# Patient Record
Sex: MALE | Race: BLACK OR AFRICAN AMERICAN | Employment: PART TIME | URBAN - METROPOLITAN AREA
[De-identification: names, ages, dates, MRNs, and addresses within clinical notes are randomized per-mention and may not be internally consistent; named-entity substitution may affect disease eponyms.]

---

## 2021-06-07 ENCOUNTER — HOSPITAL ENCOUNTER (OUTPATIENT)
Dept: CT IMAGING | Age: 61
Discharge: HOME | End: 2021-06-07
Payer: COMMERCIAL

## 2021-06-07 DIAGNOSIS — R91.8 ABNORMAL LUNG FIELD: ICD-10-CM

## 2021-06-07 PROCEDURE — 71250 CT THORAX DX C-: CPT

## 2021-07-02 ENCOUNTER — HOSPITAL ENCOUNTER (OUTPATIENT)
Dept: NUCLEAR MEDICINE | Age: 61
Discharge: HOME | End: 2021-07-02
Payer: COMMERCIAL

## 2021-07-02 DIAGNOSIS — C34.91 MALIGNANT NEOPLASM OF RIGHT LUNG, UNSPECIFIED PART OF LUNG (HCC): ICD-10-CM

## 2021-07-02 PROCEDURE — 78815 PET IMAGE W/CT SKULL-THIGH: CPT

## 2021-07-02 PROCEDURE — A9552 F18 FDG: HCPCS | Performed by: RADIOLOGY

## 2021-07-02 PROCEDURE — 3430000000 HC RX DIAGNOSTIC RADIOPHARMACEUTICAL: Performed by: RADIOLOGY

## 2021-07-02 RX ORDER — FLUDEOXYGLUCOSE F 18 200 MCI/ML
15 INJECTION, SOLUTION INTRAVENOUS
Status: COMPLETED | OUTPATIENT
Start: 2021-07-02 | End: 2021-07-02

## 2021-07-02 RX ADMIN — FLUDEOXYGLUCOSE F 18 15 MILLICURIE: 200 INJECTION, SOLUTION INTRAVENOUS at 16:57

## 2021-07-16 ENCOUNTER — HOSPITAL ENCOUNTER (OUTPATIENT)
Age: 61
Discharge: HOME | End: 2021-07-18
Payer: COMMERCIAL

## 2021-07-16 PROCEDURE — U0003 INFECTIOUS AGENT DETECTION BY NUCLEIC ACID (DNA OR RNA); SEVERE ACUTE RESPIRATORY SYNDROME CORONAVIRUS 2 (SARS-COV-2) (CORONAVIRUS DISEASE [COVID-19]), AMPLIFIED PROBE TECHNIQUE, MAKING USE OF HIGH THROUGHPUT TECHNOLOGIES AS DESCRIBED BY CMS-2020-01-R: HCPCS

## 2021-07-16 PROCEDURE — U0005 INFEC AGEN DETEC AMPLI PROBE: HCPCS

## 2021-07-18 LAB — SARS-COV-2, PCR: NOT DETECTED

## 2021-07-20 ENCOUNTER — HOSPITAL ENCOUNTER (OUTPATIENT)
Dept: GENERAL RADIOLOGY | Age: 61
Discharge: HOME | End: 2021-07-22
Payer: COMMERCIAL

## 2021-07-20 ENCOUNTER — HOSPITAL ENCOUNTER (OUTPATIENT)
Dept: CT IMAGING | Age: 61
Discharge: HOME | End: 2021-07-20
Payer: COMMERCIAL

## 2021-07-20 VITALS
OXYGEN SATURATION: 99 % | BODY MASS INDEX: 35 KG/M2 | DIASTOLIC BLOOD PRESSURE: 68 MMHG | HEART RATE: 73 BPM | WEIGHT: 250 LBS | TEMPERATURE: 97 F | RESPIRATION RATE: 16 BRPM | SYSTOLIC BLOOD PRESSURE: 132 MMHG | HEIGHT: 71 IN

## 2021-07-20 DIAGNOSIS — R91.1 LUNG NODULE: ICD-10-CM

## 2021-07-20 DIAGNOSIS — R06.02 SHORTNESS OF BREATH: ICD-10-CM

## 2021-07-20 DIAGNOSIS — J95.811 PNEUMOTHORAX AFTER BIOPSY: ICD-10-CM

## 2021-07-20 DIAGNOSIS — J93.83 OTHER PNEUMOTHORAX: ICD-10-CM

## 2021-07-20 LAB
APTT: 32.3 SEC (ref 24.5–35.1)
HCT VFR BLD CALC: 44.8 % (ref 37–54)
HEMOGLOBIN: 14.1 G/DL (ref 12.5–16.5)
INR BLD: 1
MCH RBC QN AUTO: 26.5 PG (ref 26–35)
MCHC RBC AUTO-ENTMCNC: 31.5 % (ref 32–34.5)
MCV RBC AUTO: 84.1 FL (ref 80–99.9)
METER GLUCOSE: 145 MG/DL (ref 74–99)
PDW BLD-RTO: 14.2 FL (ref 11.5–15)
PLATELET # BLD: 241 E9/L (ref 130–450)
PMV BLD AUTO: 9.2 FL (ref 7–12)
PROTHROMBIN TIME: 12.1 SEC (ref 9.3–12.4)
RBC # BLD: 5.33 E12/L (ref 3.8–5.8)
WBC # BLD: 8.6 E9/L (ref 4.5–11.5)

## 2021-07-20 PROCEDURE — 7100000010 HC PHASE II RECOVERY - FIRST 15 MIN

## 2021-07-20 PROCEDURE — 71045 X-RAY EXAM CHEST 1 VIEW: CPT

## 2021-07-20 PROCEDURE — 7100000011 HC PHASE II RECOVERY - ADDTL 15 MIN

## 2021-07-20 PROCEDURE — 85730 THROMBOPLASTIN TIME PARTIAL: CPT

## 2021-07-20 PROCEDURE — 88341 IMHCHEM/IMCYTCHM EA ADD ANTB: CPT

## 2021-07-20 PROCEDURE — 32408 CORE NDL BX LNG/MED PERQ: CPT

## 2021-07-20 PROCEDURE — 88342 IMHCHEM/IMCYTCHM 1ST ANTB: CPT

## 2021-07-20 PROCEDURE — 82962 GLUCOSE BLOOD TEST: CPT

## 2021-07-20 PROCEDURE — 6360000002 HC RX W HCPCS: Performed by: RADIOLOGY

## 2021-07-20 PROCEDURE — 85610 PROTHROMBIN TIME: CPT

## 2021-07-20 PROCEDURE — 88305 TISSUE EXAM BY PATHOLOGIST: CPT

## 2021-07-20 PROCEDURE — 85027 COMPLETE CBC AUTOMATED: CPT

## 2021-07-20 PROCEDURE — 36415 COLL VENOUS BLD VENIPUNCTURE: CPT

## 2021-07-20 RX ORDER — SODIUM CHLORIDE 0.9 % (FLUSH) 0.9 %
10 SYRINGE (ML) INJECTION PRN
Status: DISCONTINUED | OUTPATIENT
Start: 2021-07-20 | End: 2021-07-21 | Stop reason: HOSPADM

## 2021-07-20 RX ORDER — FENTANYL CITRATE 50 UG/ML
INJECTION, SOLUTION INTRAMUSCULAR; INTRAVENOUS
Status: COMPLETED | OUTPATIENT
Start: 2021-07-20 | End: 2021-07-20

## 2021-07-20 RX ORDER — MIDAZOLAM HYDROCHLORIDE 1 MG/ML
INJECTION INTRAMUSCULAR; INTRAVENOUS
Status: COMPLETED | OUTPATIENT
Start: 2021-07-20 | End: 2021-07-20

## 2021-07-20 RX ADMIN — MIDAZOLAM 1 MG: 1 INJECTION INTRAMUSCULAR; INTRAVENOUS at 09:05

## 2021-07-20 RX ADMIN — FENTANYL CITRATE 50 MCG: 50 INJECTION INTRAMUSCULAR; INTRAVENOUS at 09:05

## 2021-07-20 ASSESSMENT — PAIN DESCRIPTION - LOCATION
LOCATION: BACK

## 2021-07-20 ASSESSMENT — PAIN SCALES - GENERAL
PAINLEVEL_OUTOF10: 3
PAINLEVEL_OUTOF10: 4
PAINLEVEL_OUTOF10: 3
PAINLEVEL_OUTOF10: 4
PAINLEVEL_OUTOF10: 3

## 2021-07-20 ASSESSMENT — PAIN DESCRIPTION - DESCRIPTORS
DESCRIPTORS: ACHING
DESCRIPTORS: ACHING
DESCRIPTORS: ACHING;SORE
DESCRIPTORS: ACHING

## 2021-07-20 ASSESSMENT — PAIN DESCRIPTION - ORIENTATION
ORIENTATION: RIGHT
ORIENTATION: RIGHT;UPPER
ORIENTATION: UPPER
ORIENTATION: RIGHT;UPPER
ORIENTATION: RIGHT
ORIENTATION: UPPER;RIGHT

## 2021-07-20 ASSESSMENT — PAIN DESCRIPTION - PAIN TYPE
TYPE: SURGICAL PAIN

## 2021-07-20 ASSESSMENT — PAIN - FUNCTIONAL ASSESSMENT: PAIN_FUNCTIONAL_ASSESSMENT: 0-10

## 2021-07-20 NOTE — PROGRESS NOTES
7/20/21 1400 dr Poornima Arboleda here and talked to pt.  Instructed pt to \"take it easy when he goes home\" \"dont do anything strenuous and to return to [employer] with shortness of breath or chest pain' pt verbalized understanding. osmani rn

## 2021-07-20 NOTE — PROGRESS NOTES
7/20/21 1430 reviewed discharge instructions with pt.  Pt verbalized understanding,s igned in agreement and given copy. osmani rn

## 2021-07-20 NOTE — PROGRESS NOTES
7/20/21 1500 prn adapter removed from pt right forearm. Pressure applied to site and dsd applied.  Pt tolerated well. osmani rn

## 2021-07-20 NOTE — PROGRESS NOTES
Serial chest xrays showed a small, stable right pneumothorax following right lung mass biopsy. The patient was asymptomatic and saturating on room air at 99%. He was discharged home with instructions to return to the hospital if he experienced shortness of breath or chest pain. He assures me that someone will be staying over night with him, should he require assistance.

## 2021-08-03 ENCOUNTER — TELEPHONE (OUTPATIENT)
Dept: SURGERY | Age: 61
End: 2021-08-03

## 2021-08-03 NOTE — TELEPHONE ENCOUNTER
Called and left message for patient to call back to schedule mediport with Dr. Jennifer Mc.     Electronically signed by Lacy Verma MA on 8/3/2021 at 10:38 AM

## 2021-08-12 ENCOUNTER — HOSPITAL ENCOUNTER (OUTPATIENT)
Dept: RADIATION ONCOLOGY | Age: 61
Discharge: HOME | End: 2021-08-12
Payer: COMMERCIAL

## 2021-08-12 VITALS
OXYGEN SATURATION: 96 % | WEIGHT: 255.6 LBS | DIASTOLIC BLOOD PRESSURE: 75 MMHG | RESPIRATION RATE: 20 BRPM | HEART RATE: 78 BPM | SYSTOLIC BLOOD PRESSURE: 140 MMHG | BODY MASS INDEX: 35.65 KG/M2

## 2021-08-12 DIAGNOSIS — C34.91 NSCLC OF RIGHT LUNG (HCC): ICD-10-CM

## 2021-08-12 DIAGNOSIS — C34.90 MALIGNANT NEOPLASM OF LUNG, UNSPECIFIED LATERALITY, UNSPECIFIED PART OF LUNG (HCC): Primary | ICD-10-CM

## 2021-08-12 PROCEDURE — 99245 OFF/OP CONSLTJ NEW/EST HI 55: CPT | Performed by: RADIOLOGY

## 2021-08-12 PROCEDURE — 99205 OFFICE O/P NEW HI 60 MIN: CPT

## 2021-08-12 NOTE — PROGRESS NOTES
Rafaela Michael  1960 62 y.o. Referring Physician: Steven Lagos     PCP: Valarie Coelho DO     Vitals:    21 1455   BP: (!) 140/75   Pulse: 78   Resp: 20   SpO2: 96%        Wt Readings from Last 3 Encounters:   21 255 lb 9.6 oz (115.9 kg)   21 250 lb (113.4 kg)   21 250 lb (113.4 kg)        Body mass index is 35.65 kg/m². Chief Complaint: No chief complaint on file. Cancer Staging  No matching staging information was found for the patient. Prior Radiation Therapy? NO    Concurrent Chemo/radiation? NO    Prior Chemotherapy? NO    Prior Hormonal Therapy? NO    Head and Neck Cancer? No, patient does NOT have HN cancer. Current Outpatient Medications   Medication Sig Dispense Refill    losartan (COZAAR) 100 MG tablet Take 100 mg by mouth daily      predniSONE (DELTASONE) 10 MG tablet Take 10 mg by mouth every other day      ibuprofen (ADVIL;MOTRIN) 200 MG tablet Take 400 mg by mouth every 6 hours as needed for Pain      Naproxen Sodium (ALEVE PO) Take by mouth as needed      acetaminophen (TYLENOL) 500 MG tablet Take 500 mg by mouth every 6 hours as needed for Pain       No current facility-administered medications for this encounter.        Past Medical History:   Diagnosis Date    Arthritis     R hip     Cancer ([address]     lung     Hypertension        Past Surgical History:   Procedure Laterality Date    IR PERCUT BX LUNG/MEDIASTINUM  2021    IR PERCUT BX LUNG/MEDIASTINUM 2021 [facility] CT       Family History   Problem Relation Age of Onset    Cancer Brother         lung        Social History     Socioeconomic History    Marital status: Life Partner     Spouse name: Mendoza Aggarwal     Number of children: 3    Years of education: some college     Highest education level: Not on file   Occupational History    Not on file   Tobacco Use    Smoking status: Former Smoker     Quit date:      Years since quittin.6    Smokeless tobacco: Never Used Tobacco comment: Quit May 2021    Substance and Sexual Activity    Alcohol use: Yes     Alcohol/week: 1.0 standard drinks     Types: 1 Cans of beer per week     Comment: weekend    Drug use: Yes     Types: Marijuana     Comment: smoked 7/12/21; mostly edibles     Sexual activity: Not on file   Other Topics Concern    Not on file   Social History Narrative    Not on file     Social Determinants of Health     Financial Resource Strain:     Difficulty of Paying Living Expenses:    Food Insecurity:     Worried About [address] in the Last Year:     Ran Out of Food in the Last Year:    Transportation Needs:     Lack of Transportation (Medical):     Lack of Transportation (Non-Medical):    Physical Activity:     Days of Exercise per Week:     Minutes of Exercise per Session:    Stress:     Feeling of Stress :    Social Connections:     Frequency of Communication with Friends and Family:     Frequency of Social Gatherings with Friends and Family:     Attends Orthodox Services: Active Member of Clubs or Organizations:     Attends Club or Organization Meetings:     Marital Status:    Intimate Partner Violence:     Fear of Current or Ex-Partner:     Emotionally Abused:     Physically Abused:     Sexually Abused:            Occupation: Maintenance     Retired:  NO        REVIEW OF SYSTEMS: Pt being seen in office for consult related to lung cancer. Pt is alert & oriented x 4. VswnL. Denies any pain or discomfort. 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 Pt follows with Dr. Brittany Easley. Pt. Had a CT on 6/7 that showed a spiculated soft tissue mas to RUL & mediastinal lymphadenopathy. On 7/2 he had a pet that showed RUL SUV 20, R hilar node SUV 11, R paratracheal node SUV 5.5, R sub carinal node SUV 4.2, R upper mediastinal node SUV 5.1 & L upper mediastinal node SUV 4.4.  Pt had a CT guided biopsy on 7/20/21 & pathology revealed moderately to poorly differentiated change in patient condition which places them at a risk for a fall   4-6 Moderate Risk 1. Provide assistance as indicated for ambulation activities  2. Reorient confused/cognitively impaired patient  3. Call-light/bell within patient's reach  4. Chair/bed in low position, stretcher/bed with siderails up except when performing patient care activities  5. Educate patient/family/caregiver on falls prevention  6. Falls risk precaution (Yellow sticker Level II) placed on patient chart   7 or   Higher High Risk 1. Place patient in easily observable treatment room  2. Patient attended at all times by family member or staff  3. Provide assistance as indicated for ambulation activities  4. Reorient confused/cognitively impaired patient  5. Call-light/bell within patient's reach  6. Chair/bed in low position, stretcher/bed with siderails up except when performing patient care activities  7. Educate patient/family/caregiver on falls prevention  8. Falls risk precaution (Yellow sticker Level III) placed on patient chart           MALNUTRITION RISK SCREENING ASSESSMENT    Instructions:  Assess the patient and enter the appropriate indicators that are present for nutrition risk identification. Total the numbers entered and assign a risk score. Follow the appropriate action for total score listed below. Assessment   Date  8/12/2021     Have you lost weight without trying? 0- No     Have you been eating poorly because of a decreased appetite? 0- No   3. Do you have a diagnosis of head and neck cancer? 0- No                                                                                    TOTAL 0          Score of 0-1: No action  Score 2 or greater:   For Non-Diabetic Patient: Recommend adding Ensure Complete 2 x daily and provide patient with Ensure wellness bag with coupons  For Diabetic Patient: Recommend adding Glucerna Shake 2 x daily and provide patient with Glucerna Wellness bag with coupons  Route to the dietitian via [address]    Are you having difficulty performing daily routine tasks due to fatigue or weakness (ie: bathing/showering, dressing, housework, meal prep, work, , etc): No     Do you have any arm flexibility/ROM restrictions, swelling or pain that limit activity: No     Any changes in memory, attention/focus that impact daily activities: No     Do you avoid participation in leisure/social activity due to weakness, fatigue or pain: No     ARE ANY OF THE ABOVE ARE ANSWERED YES: No          PT ASSESSMENT FOR REFERRAL    Have you had any recent falls in the past 2 months: No     Do you have difficulty going up/down stairs: No     Are you having difficulty walking: No     Do you often hold onto furniture/environmental supports or feel off balance when you are walking: No     Do you need to take rest breaks when you are walking: No     Any pain on a scale of 1-10 that limits your mobility: No 0 /10    ARE ANY OF THE ABOVE ARE ANSWERED YES: No                     PREHAB AUDIOLOGY REFERRAL    - Is patient planned to receive Cisplatin? Unknown. Will check with Dr Jewel Espitia MD and request audiology consult as indicated. - Is patient planned to receive radiation therapy that may be directed toward auditory canals or nerves? No. Patient is not planned to start radiation therapy to auditory canals or nerves. - Is patient complaining of new onset hearing loss? No. Patient is not complaining of new onset hearing loss. Patient education given on RT. The patient expresses understanding and acceptance of instructions.  Orville Roblero RN 8/12/2021 3:03 PM           Orville Roblero RN

## 2021-08-12 NOTE — PROGRESS NOTES
Radiation Oncology      Siomara Castro. [address]      Referring Physician: Dr. Elaine Lorenzo      Primary Care Physician:Nitin Fraga DO   Primary Oncologist: Dr. Elaine Lorenzo      Diagnosis: cT3 cN2 cMo RUL NSCLC      Service:  Radiation Oncology consultation performed on 8/12/21        HPI:        Jersey Spivey is an pleasant and well appearing 62year old with locally advanced NSCLC. Pt follows with Dr. Sneha Davies. Pt. Had a CT on 6/7 that showed a spiculated soft tissue mas to RUL & mediastinal lymphadenopathy. On 7/2 he had a pet that showed RUL SUV 20, R hilar node SUV 11, R paratracheal node SUV 5.5, R sub carinal node SUV 4.2, R upper mediastinal node SUV 5.1 & L upper mediastinal node SUV 4.4. Pt had a CT guided biopsy on 7/20/21 & pathology revealed moderately to poorly differentiated squamous cell carcinoma. Clinical stage IIIC. The patient presents today to discuss fractionated external beam radiation therapy as a component of multidisciplinary, definative management. We reviewed the available medical records including the complete medical history of this pt today prior to consultation. Epic -CE and available scanned documents per the Epic Media tab were reviewed PRN. A complete ROS was also performed today and is noted below. During consultation today I personally discussed the pts workup to date; including but not limited to applicable imaging studies, Pathology reports, and interventions. The NCCN guidelines, as pertaining to the above diagnosis were also recapped for the pt today in brief. Today, Lb Safe  notes Sx that include periodic cough. KPS 80-90.        -----    Per [address]:      [place] records reviewed.     -----    Pathology reviewed:    7/20/21:  Diagnosis:   Mass, lung, transbronchial biopsy: Invasive moderately to poorly   differentiated squamous cell carcinoma.     -----      Past Medical History:   Diagnosis Date    Arthritis     R hip     [address] [address]     lung     Hypertension        Past Surgical History:   Procedure Laterality Date    IR PERCUT BX LUNG/MEDIASTINUM  2021    IR PERCUT BX LUNG/MEDIASTINUM 2021 [facility]       Family History   Problem Relation Age of Onset    Cancer Brother         lung        Current Outpatient Medications   Medication Sig Dispense Refill    losartan (COZAAR) 100 MG tablet Take 100 mg by mouth daily      predniSONE (DELTASONE) 10 MG tablet Take 10 mg by mouth every other day      ibuprofen (ADVIL;MOTRIN) 200 MG tablet Take 400 mg by mouth every 6 hours as needed for Pain      Naproxen Sodium (ALEVE PO) Take by mouth as needed      acetaminophen (TYLENOL) 500 MG tablet Take 500 mg by mouth every 6 hours as needed for Pain       No current facility-administered medications for this encounter. No Known Allergies    Social History     Socioeconomic History    Marital status: Life Partner     Spouse name: Kiko See     Number of children: 3    Years of education: some college     Highest education level: None   Occupational History    None   Tobacco Use    Smoking status: Former Smoker     Quit date:      Years since quittin.6    Smokeless tobacco: Never Used    Tobacco comment: Quit May 2021    Substance and Sexual Activity    Alcohol use: Yes     Alcohol/week: 1.0 standard drinks     Types: 1 Cans of beer per week     Comment: weekend    Drug use: Yes     Types: Marijuana     Comment: smoked 21; mostly edibles     Sexual activity: None   Other Topics Concern    None   Social History Narrative    None     Social Determinants of Health     Financial Resource Strain:     Difficulty of Paying Living Expenses:    Food Insecurity:     Worried About Running Out of Food in the Last Year:     Ran Out of Food in the Last Year:    Transportation Needs:     Lack of Transportation (Medical):      Lack of Skin is dry. Neurological:      General: No focal deficit present. Mental Status: He is alert. Psychiatric:         Mood and Affect: Mood normal.         Behavior: Behavior normal.         Thought Content: Thought content normal.         Judgment: Judgment normal.             Imaging reviewed:      -MRI brain pending    -PET 7/2/21:  Impression   1.  Metabolically active right upper lobe mass consistent with history of   lung cancer with metabolically active metastatic right hilar and mediastinal   lymph node metastases including right and left upper mediastinal lymph nodes   and a subcarinal lymph node.       2.  No metabolically active distant metastatic disease. Radiation Safety and Treatment Support:  -previous Radiation history: No  -history of connective tissue disease: No  -history of autoimmune disease: No  -pregnant: not applicable  -fertility conservation and /or contraception discussed: no  -nutrition consult prior to [place]: Yes  -PEG: No  -Dental evaluation prior to treatment:No  -Social Work requested: Yes  -Oncology Nurse Navigator requested: Yes  -pre + post treatment PT / Rehab / PM+R evaluation considered: Yes  -ICD: No   -ICD brand: -  -[facility] patient navigator: Yoav Parrish  -Nurse Practitioners for Radiation Oncology:    ---Crystal Pearson, MSN, RN, FNP-C   ---Mukesh Lujan, MSN, RN, FNP-BC        Assessment and Plan: Mark Williamson is a pleasant and cooperative 62year old with a recent diagnosis of AJCC stage group III NSCLC. We recommend a concurrent approach to definitive management of this locally advanced non small cell lung cancer, biopsy proven NSCLC [chemo dosing per [address] record- see EMR]. Based on the clinical characteristic, this disease and stage is generally considered unresectable; optimal therapy tends to be a concurrent chemo-RT approach.  Concomitant chemo-RT compared with sequential chemo-RT improved overall survival, primarily through better locoregional control, at the cost of manageable increases in acute esophageal toxicity as based on the Auperin metanalysis. These data demonstrate a benefit of concomitant chemo-RT over sequential chemo->RT on OS (HR 0.84, SS), with absolute benefit at 3-years of 5.7% (18% to 24%), 5-years 4.5% (11% to 15%). Interestingly, there was no difference in PFS (HR 0.9, p=0.07). However a decrease in locoregional progression (HR 0.777, SS), with absolute decrease of 6% at 5 years (35% to 29%) was shown. There was no difference on distant progression (HR 1.04, NS), with 5-year rate of about 40% Lubertha Mandril Oncol 2010 May 1;28(13):9682-1754). Regarding the radiation dose, 60 Gy in 2Gy/fx was established long ago in RTOG 73-01, with several other trials showing a feasibility of much higher doses however with RT alone. Interestingly in the concurrent era, RTOG 0617 tested higher dose arms (Concurrent RT + Carbo/Taxol +/- Cetuximab) these were closed early with \"negative\" results (longer term results and POFA needed), therefor 60 Gy in daily fractions with dual agent chemotherapy can be considered the standard (the Agnieszka and LAMP trials also assisted in the development of this paradigm). Fractionated external beam radiation therapy may or may not be delivered with intensity modulation +/- image guidance per daily cone beam depending on the specific patient anatomy and dose constraints as described per the RTOG and QUANTEC ---Milton Chiu, Int J Radiat Oncol Biol Phys. 2010 Mar 1;76(3 Suppl):S10-9. The risks, benefits, alternatives, process and logistics of external beam radiation were reviewed (risks include but are not limited to worsening PULM function, fibrosis, esophagitis, esophageal structure, perforations, bleeding, paralysis and death). We answered all of the patient's questions to the best of our ability. Woodard Krabbe verbalized understanding and seemed satisfied.  Radiation planning will commence within 7-10 days; the next step in management being the simulation scan, with external beam radiation to commence in a timely fashion thereafter. It was a pleasure meeting Yee Busby today and we appreciate the referral and opportunity to be involved in his care. We had an extensive discussion today regarding the course to date (including a focused review of theapplicable radiographic and laboratory information), multidisciplinary approach to cancer care, and indications for external beam radiation therapy as a component therein. A literature review and multidisciplinary discussion was performed after seeing this patient due to the complexity of the medical decision making in this case. I personally spent greater than 90 minutes on this case and with this patient. I performed the complete history and physical as above at today's visit, at least 45 minutes was in direct discussion and  regarding disease management.          -sim ASAP  -concurrent        Johnson Foley. Raza Chaudhary MD [place] [address] Oncology  Cell: [phone]    [facility]:  [facility]: [phone]  [facility]:  [phone]   FAX:    [phone]  [facility]:  [phone]   FAX:  [phone]        NOTE: This report was transcribed using voice recognition software. Every effort was made to ensure accuracy; however, inadvertent computerized transcription errors may be present.

## 2021-08-13 ENCOUNTER — HOSPITAL ENCOUNTER (OUTPATIENT)
Dept: RADIATION ONCOLOGY | Age: 61
Discharge: HOME | End: 2021-08-13
Attending: RADIOLOGY
Payer: COMMERCIAL

## 2021-08-13 PROCEDURE — 77334 RADIATION TREATMENT AID(S): CPT | Performed by: RADIOLOGY

## 2021-08-13 PROCEDURE — 77263 THER RADIOLOGY TX PLNG CPLX: CPT | Performed by: RADIOLOGY

## 2021-08-13 NOTE — PATIENT INSTRUCTIONS
DUNG Abbott. Casper Mercedes MD [place] [address] Oncology  Cell: [phone]    [facility]:  [phone]   FAX: [phone]  [facility]:  [address] [place] [address] Avenue:    [phone]  [facility]:  [phone]   FAX:  [phone]  Email: [email or web address]

## 2021-08-17 ENCOUNTER — TELEPHONE (OUTPATIENT)
Dept: SURGERY | Age: 61
End: 2021-08-17

## 2021-08-17 NOTE — TELEPHONE ENCOUNTER
Prior Authorization Form:      DEMOGRAPHICS:                     Patient Name:  Christiano Carreon  Patient :  1960            Insurance:  Payor: [employer] / Plan: [employer] [address] / Product Type: *No Product type* /   Insurance ID Number:    Payor/Plan Subscr  Sex Relation Sub. Ins. ID Effective Group Num   1.  [employer]* Mariana Choco 1960 Male Self [id number]                                   [address]         DIAGNOSIS & PROCEDURE:                       Procedure/Operation: mediport insertion           CPT Code: 54930    Diagnosis:  Poor venous access    ICD10 Code: I87.8    Location:  [address] [place] [address]    Surgeon:  Ladonna Alfaro INFORMATION:                          Date: 2021    Time:               Anesthesia:                                                         Status:  Outpatient        Special Comments:           Electronically signed by Sharee Dowd MA on 2021 at 9:44 AM

## 2021-08-18 ENCOUNTER — PREP FOR PROCEDURE (OUTPATIENT)
Dept: SURGERY | Age: 61
End: 2021-08-18

## 2021-08-18 RX ORDER — SODIUM CHLORIDE 0.9 % (FLUSH) 0.9 %
5-40 SYRINGE (ML) INJECTION PRN
Status: CANCELLED | OUTPATIENT
Start: 2021-08-18

## 2021-08-18 RX ORDER — SODIUM CHLORIDE 0.9 % (FLUSH) 0.9 %
5-40 SYRINGE (ML) INJECTION EVERY 12 HOURS SCHEDULED
Status: CANCELLED | OUTPATIENT
Start: 2021-08-18

## 2021-08-18 RX ORDER — SODIUM CHLORIDE 9 MG/ML
25 INJECTION, SOLUTION INTRAVENOUS PRN
Status: CANCELLED | OUTPATIENT
Start: 2021-08-18

## 2021-08-18 RX ORDER — SODIUM CHLORIDE, SODIUM LACTATE, POTASSIUM CHLORIDE, CALCIUM CHLORIDE 600; 310; 30; 20 MG/100ML; MG/100ML; MG/100ML; MG/100ML
INJECTION, SOLUTION INTRAVENOUS CONTINUOUS
Status: CANCELLED | OUTPATIENT
Start: 2021-08-18

## 2021-08-20 ENCOUNTER — HOSPITAL ENCOUNTER (OUTPATIENT)
Age: 61
Discharge: HOME | End: 2021-08-22
Payer: COMMERCIAL

## 2021-08-20 DIAGNOSIS — Z01.818 PREOP TESTING: ICD-10-CM

## 2021-08-20 PROCEDURE — U0005 INFEC AGEN DETEC AMPLI PROBE: HCPCS

## 2021-08-20 PROCEDURE — U0003 INFECTIOUS AGENT DETECTION BY NUCLEIC ACID (DNA OR RNA); SEVERE ACUTE RESPIRATORY SYNDROME CORONAVIRUS 2 (SARS-COV-2) (CORONAVIRUS DISEASE [COVID-19]), AMPLIFIED PROBE TECHNIQUE, MAKING USE OF HIGH THROUGHPUT TECHNOLOGIES AS DESCRIBED BY CMS-2020-01-R: HCPCS

## 2021-08-21 LAB
SARS-COV-2: NOT DETECTED
SOURCE: NORMAL

## 2021-08-23 NOTE — PROGRESS NOTES
[address] [facility]                                                                                                                    PRE OP INSTRUCTIONS FOR  Reyna Gudino        Date: 8/23/2021    Date of surgery: 8/24/21   Arrival Time: Hospital will call you between 5pm and 7pm with your final arrival time for surgery    1. Do not eat or drink anything after midnight prior to surgery. This includes no water, chewing gum, mints or ice chips. 2. Take the following medications with a small sip of water on the morning of Surgery: tylenol prn     3. Diabetics may take evening dose of insulin but none after midnight. If you feel symptomatic or low blood sugar morning of surgery drink 1-2 ounces of apple juice only. 4. Aspirin, Ibuprofen, Advil, Naproxen, Vitamin E and other Anti-inflammatory products should be stopped  before surgery  as directed by your physician. Take Tylenol only unless instructed otherwise by your surgeon. 5. Check with your Doctor regarding stopping Plavix, Coumadin, Lovenox, Eliquis, Effient, or other blood thinners. 6. Do not smoke,use illicit drugs and do not drink any alcoholic beverages 24 hours prior to surgery. 7. You may brush your teeth the morning of surgery. DO NOT SWALLOW WATER    8. You MUST make arrangements for a responsible adult to take you home after your surgery. You will not be allowed to leave alone or drive yourself home. It is strongly suggested someone stay with you the first 24 hrs. Your surgery will be cancelled if you do not have a ride home. 9. PEDIATRIC PATIENTS ONLY:  A parent/legal guardian must accompany a child scheduled for surgery and plan to stay at the hospital until the child is discharged. Please do not bring other children with you.     10. Please wear simple, loose fitting clothing to the hospital.  Reino Apley not bring valuables (money, credit cards, checkbooks, etc.) Do not wear any makeup (including no eye makeup) or nail polish on your fingers or toes. 11. DO NOT wear any jewelry or piercings on day of surgery. All body piercing jewelry must be removed. 12. Shower the night before surgery with ___Antibacterial soap /DANNA WIPES________    13. TOTAL JOINT REPLACEMENT/HYSTERECTOMY PATIENTS ONLY---Remember to bring Blood Bank bracelet to the hospital on the day of surgery. 14. If you have a Living Will and Durable Power of  for Healthcare, please bring in a copy. 15. If appropriate bring crutches, inspirex, WALKER, CANE etc... 12. Notify your Surgeon if you develop any illness between now and surgery time, cough, cold, fever, sore throat, nausea, vomiting, etc.  Please notify your surgeon if you experience dizziness, shortness of breath or blurred vision between now & the time of your surgery. 17. If you have ___dentures, they will be removed before going to the OR; we will provide you a container. If you wear ___contact lenses or ___glasses, they will be removed; please bring a case for them. 18. To provide excellent care visitors will be limited to 1 in the room at any given time. 19. Please bring picture ID and insurance card. 20. Sleep apnea patients need to bring CPAP AND SETTINGS to hospital on day of surgery. 21. During flu season no children under the age of 15 are permitted in the hospital for the safety of all patients. 22. Other please check in information desk/ please a wear. Please call AMBULATORY CARE if you have any further questions.    [address] [facility]     [address]

## 2021-08-24 ENCOUNTER — HOSPITAL ENCOUNTER (OUTPATIENT)
Dept: GENERAL RADIOLOGY | Age: 61
Discharge: HOME | End: 2021-08-26
Attending: SURGERY
Payer: COMMERCIAL

## 2021-08-24 ENCOUNTER — ANESTHESIA (OUTPATIENT)
Dept: OPERATING ROOM | Age: 61
End: 2021-08-24
Payer: COMMERCIAL

## 2021-08-24 ENCOUNTER — ANESTHESIA EVENT (OUTPATIENT)
Dept: OPERATING ROOM | Age: 61
End: 2021-08-24
Payer: COMMERCIAL

## 2021-08-24 ENCOUNTER — HOSPITAL ENCOUNTER (OUTPATIENT)
Age: 61
Setting detail: OUTPATIENT SURGERY
Discharge: HOME | End: 2021-08-24
Attending: SURGERY | Admitting: SURGERY
Payer: COMMERCIAL

## 2021-08-24 ENCOUNTER — APPOINTMENT (OUTPATIENT)
Dept: GENERAL RADIOLOGY | Age: 61
End: 2021-08-24
Attending: SURGERY
Payer: COMMERCIAL

## 2021-08-24 VITALS
HEART RATE: 72 BPM | WEIGHT: 254 LBS | HEIGHT: 71 IN | DIASTOLIC BLOOD PRESSURE: 80 MMHG | TEMPERATURE: 97.2 F | SYSTOLIC BLOOD PRESSURE: 128 MMHG | OXYGEN SATURATION: 98 % | RESPIRATION RATE: 16 BRPM | BODY MASS INDEX: 35.56 KG/M2

## 2021-08-24 VITALS
SYSTOLIC BLOOD PRESSURE: 121 MMHG | OXYGEN SATURATION: 96 % | DIASTOLIC BLOOD PRESSURE: 74 MMHG | RESPIRATION RATE: 16 BRPM

## 2021-08-24 DIAGNOSIS — Z01.818 PREOP TESTING: Primary | ICD-10-CM

## 2021-08-24 DIAGNOSIS — I87.8 POOR VENOUS ACCESS: ICD-10-CM

## 2021-08-24 LAB
APTT: 26.6 SEC (ref 24.5–35.1)
EKG ATRIAL RATE: 76 BPM
EKG P AXIS: 70 DEGREES
EKG P-R INTERVAL: 160 MS
EKG Q-T INTERVAL: 374 MS
EKG QRS DURATION: 80 MS
EKG QTC CALCULATION (BAZETT): 420 MS
EKG R AXIS: 41 DEGREES
EKG T AXIS: 30 DEGREES
EKG VENTRICULAR RATE: 76 BPM
INR BLD: 1.1
METER GLUCOSE: 182 MG/DL (ref 74–99)
PROTHROMBIN TIME: 12.6 SEC (ref 9.3–12.4)

## 2021-08-24 PROCEDURE — 2500000003 HC RX 250 WO HCPCS: Performed by: SURGERY

## 2021-08-24 PROCEDURE — 3600000003 HC SURGERY LEVEL 3 BASE: Performed by: SURGERY

## 2021-08-24 PROCEDURE — 2709999900 HC NON-CHARGEABLE SUPPLY: Performed by: SURGERY

## 2021-08-24 PROCEDURE — 2580000003 HC RX 258: Performed by: SURGERY

## 2021-08-24 PROCEDURE — 3209999900 FLUORO FOR SURGICAL PROCEDURES

## 2021-08-24 PROCEDURE — 7100000011 HC PHASE II RECOVERY - ADDTL 15 MIN: Performed by: SURGERY

## 2021-08-24 PROCEDURE — 85730 THROMBOPLASTIN TIME PARTIAL: CPT

## 2021-08-24 PROCEDURE — 6360000002 HC RX W HCPCS: Performed by: NURSE ANESTHETIST, CERTIFIED REGISTERED

## 2021-08-24 PROCEDURE — 3700000001 HC ADD 15 MINUTES (ANESTHESIA): Performed by: SURGERY

## 2021-08-24 PROCEDURE — 7100000010 HC PHASE II RECOVERY - FIRST 15 MIN: Performed by: SURGERY

## 2021-08-24 PROCEDURE — 93005 ELECTROCARDIOGRAM TRACING: CPT | Performed by: ANESTHESIOLOGY

## 2021-08-24 PROCEDURE — C1788 PORT, INDWELLING, IMP: HCPCS | Performed by: SURGERY

## 2021-08-24 PROCEDURE — 6360000002 HC RX W HCPCS: Performed by: SURGERY

## 2021-08-24 PROCEDURE — 36561 INSERT TUNNELED CV CATH: CPT | Performed by: SURGERY

## 2021-08-24 PROCEDURE — 71045 X-RAY EXAM CHEST 1 VIEW: CPT

## 2021-08-24 PROCEDURE — 36415 COLL VENOUS BLD VENIPUNCTURE: CPT

## 2021-08-24 PROCEDURE — C1881 DIALYSIS ACCESS SYSTEM: HCPCS | Performed by: SURGERY

## 2021-08-24 PROCEDURE — 85610 PROTHROMBIN TIME: CPT

## 2021-08-24 PROCEDURE — 3600000013 HC SURGERY LEVEL 3 ADDTL 15MIN: Performed by: SURGERY

## 2021-08-24 PROCEDURE — 3700000000 HC ANESTHESIA ATTENDED CARE: Performed by: SURGERY

## 2021-08-24 PROCEDURE — 82962 GLUCOSE BLOOD TEST: CPT

## 2021-08-24 DEVICE — PORT SMARTPORT 8FR CT TI W/VLV SHTH: Type: IMPLANTABLE DEVICE | Status: FUNCTIONAL

## 2021-08-24 RX ORDER — HYDRALAZINE HYDROCHLORIDE 20 MG/ML
5 INJECTION INTRAMUSCULAR; INTRAVENOUS EVERY 10 MIN PRN
Status: DISCONTINUED | OUTPATIENT
Start: 2021-08-24 | End: 2021-08-24 | Stop reason: HOSPADM

## 2021-08-24 RX ORDER — MIDAZOLAM HYDROCHLORIDE 1 MG/ML
INJECTION INTRAMUSCULAR; INTRAVENOUS PRN
Status: DISCONTINUED | OUTPATIENT
Start: 2021-08-24 | End: 2021-08-24 | Stop reason: SDUPTHER

## 2021-08-24 RX ORDER — BUPIVACAINE HYDROCHLORIDE AND EPINEPHRINE 2.5; 5 MG/ML; UG/ML
INJECTION, SOLUTION EPIDURAL; INFILTRATION; INTRACAUDAL; PERINEURAL PRN
Status: DISCONTINUED | OUTPATIENT
Start: 2021-08-24 | End: 2021-08-24 | Stop reason: ALTCHOICE

## 2021-08-24 RX ORDER — SODIUM CHLORIDE, SODIUM LACTATE, POTASSIUM CHLORIDE, CALCIUM CHLORIDE 600; 310; 30; 20 MG/100ML; MG/100ML; MG/100ML; MG/100ML
INJECTION, SOLUTION INTRAVENOUS CONTINUOUS
Status: DISCONTINUED | OUTPATIENT
Start: 2021-08-24 | End: 2021-08-24 | Stop reason: HOSPADM

## 2021-08-24 RX ORDER — PROPOFOL 10 MG/ML
INJECTION, EMULSION INTRAVENOUS CONTINUOUS PRN
Status: DISCONTINUED | OUTPATIENT
Start: 2021-08-24 | End: 2021-08-24 | Stop reason: SDUPTHER

## 2021-08-24 RX ORDER — ONDANSETRON HYDROCHLORIDE 8 MG/1
TABLET, FILM COATED ORAL
COMMUNITY
Start: 2021-07-30

## 2021-08-24 RX ORDER — ALBUTEROL SULFATE 90 UG/1
AEROSOL, METERED RESPIRATORY (INHALATION)
COMMUNITY
Start: 2021-05-28

## 2021-08-24 RX ORDER — HYDROCODONE BITARTRATE AND ACETAMINOPHEN 5; 325 MG/1; MG/1
2 TABLET ORAL PRN
Status: DISCONTINUED | OUTPATIENT
Start: 2021-08-24 | End: 2021-08-24 | Stop reason: HOSPADM

## 2021-08-24 RX ORDER — FENTANYL CITRATE 50 UG/ML
25 INJECTION, SOLUTION INTRAMUSCULAR; INTRAVENOUS EVERY 5 MIN PRN
Status: DISCONTINUED | OUTPATIENT
Start: 2021-08-24 | End: 2021-08-24 | Stop reason: HOSPADM

## 2021-08-24 RX ORDER — FENTANYL CITRATE 50 UG/ML
INJECTION, SOLUTION INTRAMUSCULAR; INTRAVENOUS PRN
Status: DISCONTINUED | OUTPATIENT
Start: 2021-08-24 | End: 2021-08-24 | Stop reason: SDUPTHER

## 2021-08-24 RX ORDER — SODIUM CHLORIDE 0.9 % (FLUSH) 0.9 %
5-40 SYRINGE (ML) INJECTION EVERY 12 HOURS SCHEDULED
Status: DISCONTINUED | OUTPATIENT
Start: 2021-08-24 | End: 2021-08-24 | Stop reason: HOSPADM

## 2021-08-24 RX ORDER — SODIUM CHLORIDE 0.9 % (FLUSH) 0.9 %
5-40 SYRINGE (ML) INJECTION PRN
Status: DISCONTINUED | OUTPATIENT
Start: 2021-08-24 | End: 2021-08-24 | Stop reason: HOSPADM

## 2021-08-24 RX ORDER — FENTANYL CITRATE 50 UG/ML
50 INJECTION, SOLUTION INTRAMUSCULAR; INTRAVENOUS EVERY 5 MIN PRN
Status: DISCONTINUED | OUTPATIENT
Start: 2021-08-24 | End: 2021-08-24 | Stop reason: HOSPADM

## 2021-08-24 RX ORDER — HEPARIN SODIUM (PORCINE) LOCK FLUSH IV SOLN 100 UNIT/ML 100 UNIT/ML
SOLUTION INTRAVENOUS PRN
Status: DISCONTINUED | OUTPATIENT
Start: 2021-08-24 | End: 2021-08-24 | Stop reason: ALTCHOICE

## 2021-08-24 RX ORDER — HYDROCODONE BITARTRATE AND ACETAMINOPHEN 5; 325 MG/1; MG/1
1 TABLET ORAL PRN
Status: DISCONTINUED | OUTPATIENT
Start: 2021-08-24 | End: 2021-08-24 | Stop reason: HOSPADM

## 2021-08-24 RX ORDER — SODIUM CHLORIDE 9 MG/ML
INJECTION INTRAVENOUS PRN
Status: DISCONTINUED | OUTPATIENT
Start: 2021-08-24 | End: 2021-08-24 | Stop reason: ALTCHOICE

## 2021-08-24 RX ORDER — MEPERIDINE HYDROCHLORIDE 25 MG/ML
12.5 INJECTION INTRAMUSCULAR; INTRAVENOUS; SUBCUTANEOUS EVERY 5 MIN PRN
Status: DISCONTINUED | OUTPATIENT
Start: 2021-08-24 | End: 2021-08-24 | Stop reason: HOSPADM

## 2021-08-24 RX ORDER — TRAMADOL HYDROCHLORIDE 50 MG/1
50 TABLET ORAL EVERY 6 HOURS PRN
Qty: 12 TABLET | Refills: 0 | Status: SHIPPED | OUTPATIENT
Start: 2021-08-24 | End: 2021-08-27

## 2021-08-24 RX ORDER — LABETALOL HYDROCHLORIDE 5 MG/ML
5 INJECTION, SOLUTION INTRAVENOUS EVERY 10 MIN PRN
Status: DISCONTINUED | OUTPATIENT
Start: 2021-08-24 | End: 2021-08-24 | Stop reason: HOSPADM

## 2021-08-24 RX ORDER — DEXAMETHASONE 4 MG/1
TABLET ORAL
COMMUNITY
Start: 2021-07-30

## 2021-08-24 RX ORDER — SODIUM CHLORIDE 9 MG/ML
25 INJECTION, SOLUTION INTRAVENOUS PRN
Status: DISCONTINUED | OUTPATIENT
Start: 2021-08-24 | End: 2021-08-24 | Stop reason: HOSPADM

## 2021-08-24 RX ADMIN — FENTANYL CITRATE 100 MCG: 50 INJECTION, SOLUTION INTRAMUSCULAR; INTRAVENOUS at 13:47

## 2021-08-24 RX ADMIN — SODIUM CHLORIDE, POTASSIUM CHLORIDE, SODIUM LACTATE AND CALCIUM CHLORIDE: 600; 310; 30; 20 INJECTION, SOLUTION INTRAVENOUS at 12:20

## 2021-08-24 RX ADMIN — Medication 2000 MG: at 13:50

## 2021-08-24 RX ADMIN — MIDAZOLAM 2 MG: 1 INJECTION INTRAMUSCULAR; INTRAVENOUS at 13:43

## 2021-08-24 RX ADMIN — PROPOFOL INJECTABLE EMULSION 150 MCG/KG/MIN: 10 INJECTION, EMULSION INTRAVENOUS at 13:49

## 2021-08-24 ASSESSMENT — PULMONARY FUNCTION TESTS
PIF_VALUE: 1
PIF_VALUE: 2
PIF_VALUE: 1
PIF_VALUE: 0
PIF_VALUE: 1
PIF_VALUE: 0
PIF_VALUE: 1

## 2021-08-24 ASSESSMENT — PAIN - FUNCTIONAL ASSESSMENT
PAIN_FUNCTIONAL_ASSESSMENT: ACTIVITIES ARE NOT PREVENTED
PAIN_FUNCTIONAL_ASSESSMENT: 0-10

## 2021-08-24 ASSESSMENT — LIFESTYLE VARIABLES: SMOKING_STATUS: 0

## 2021-08-24 ASSESSMENT — PAIN SCALES - GENERAL: PAINLEVEL_OUTOF10: 0

## 2021-08-24 ASSESSMENT — PAIN DESCRIPTION - DESCRIPTORS: DESCRIPTORS: SORE

## 2021-08-24 NOTE — H&P
Done day of due to covid 19 risk    General Surgery History and Physical    Patient's Name/Date of Birth: Viet Contreras / 1960    Date: 2021     Surgeon: Zainab Justice M.D.    PCP: Ladarius Antunez DO     Chief Complaint: poor venous access with lung cancer     HPI:   Viet Contreras is a 62 y.o. male who presents for evaluation of cancer and poor venous access for port placement. Past Medical History:   Diagnosis Date    Arthritis     R hip     Cancer ([address]     lung     Hypertension        Past Surgical History:   Procedure Laterality Date    IR PERCUT BX LUNG/MEDIASTINUM  2021    IR PERCUT BX LUNG/MEDIASTINUM 2021 [facility]       No current facility-administered medications for this encounter. Current Outpatient Medications   Medication Sig Dispense Refill    losartan (COZAAR) 100 MG tablet Take 100 mg by mouth daily      predniSONE (DELTASONE) 10 MG tablet Take 10 mg by mouth every other day      ibuprofen (ADVIL;MOTRIN) 200 MG tablet Take 400 mg by mouth every 6 hours as needed for Pain      Naproxen Sodium (ALEVE PO) Take by mouth as needed      acetaminophen (TYLENOL) 500 MG tablet Take 500 mg by mouth every 6 hours as needed for Pain         No Known Allergies    The patient has a family history that is negative for severe cardiovascular or respiratory issues, negative for reaction to anesthesia. Social History     Socioeconomic History    Marital status: Life Partner     Spouse name: Farzad Higginbotham     Number of children: 3    Years of education: some college     Highest education level: Not on file   Occupational History    Not on file   Tobacco Use    Smoking status: Former Smoker     Quit date:      Years since quittin.6    Smokeless tobacco: Never Used    Tobacco comment: Quit May 2021    Substance and Sexual Activity    Alcohol use:  Yes     Alcohol/week: 1.0 standard drinks     Types: 1 Cans of beer per week     Comment: weekend   Shohola Self discharge, genital ulcers or hematuria  Musculoskeletal ROS: negative for - gait disturbance, muscle pain or muscular weakness    Physical exam:   There were no vitals taken for this visit. General appearance:  NAD  Pyscho/social: negative for tremors and hallucinations  Head: NCAT, PERRLA, EOMI, red conjunctiva  Neck: supple, no masses  Lungs: CTAB, equal chest rise bilateral  Heart: Reg rate  Abdomen: soft, nontender, nondistended  Skin; no lesions  Gu: no cva tenderness  Extremities: extremities normal, atraumatic, no cyanosis or edema      Assessment:  62 y.o. male with poor venous access and lung cancer for mediport     Plan: To OR for placement  Discussed the risk, benefits and alternatives of surgery including wound infections, bleeding, scar  and the risks of  anesthetic including MI, CVA, sudden death or reactions to anesthetic medications. The patient understands the risks and alternatives. All questions were answered to the patient's satisfaction and they freely signed the consent.     Kota Latham MD  10:09 AM  8/24/2021

## 2021-08-24 NOTE — ANESTHESIA PRE PROCEDURE
Department of Anesthesiology  Preprocedure Note       Name:  Chaz Deleon   Age:  62 y.o.  :  1960                                          MRN:  [id number]         Date:  2021      Surgeon: Africa Richards):  Tayla Vasquez MD    Procedure: Procedure(s): MEDIPORT INSERTION    Medications prior to admission:   Prior to Admission medications    Medication Sig Start Date End Date Taking? Authorizing Provider   fluticasone-salmeterol (ADVAIR) 500-50 MCG/DOSE diskus inhaler inhale 1 puff by mouth twice a day (RINSE MOUTH AFTER USE) 21  Yes Historical Provider, MD   losartan (COZAAR) 100 MG tablet Take 100 mg by mouth daily   Yes Historical Provider, MD   predniSONE (DELTASONE) 10 MG tablet Take 10 mg by mouth every other day   Yes Historical Provider, MD   albuterol sulfate  (90 Base) MCG/ACT inhaler inhale 2 puffs by mouth every 6 hours if needed 21   Historical Provider, MD   ondansetron (ZOFRAN) 8 MG tablet take 1 tablet by mouth every 8 hours if needed for nausea and vomiting 21   Historical Provider, MD   dexamethasone (DECADRON) 4 MG tablet take 5 tablets (20 MILLIGRAMS) by mouth as directed 12 hours AND . ..  (REFER TO PRESCRIPTION NOTES).  21   Historical Provider, MD   ibuprofen (ADVIL;MOTRIN) 200 MG tablet Take 400 mg by mouth every 6 hours as needed for Pain    Historical Provider, MD   Naproxen Sodium (ALEVE PO) Take by mouth as needed    Historical Provider, MD   acetaminophen (TYLENOL) 500 MG tablet Take 500 mg by mouth every 6 hours as needed for Pain    Historical Provider, MD       Current medications:    Current Facility-Administered Medications   Medication Dose Route Frequency Provider Last Rate Last Admin    0.9 % sodium chloride infusion  25 mL IntraVENous PRN Tayla Vasquez MD        ceFAZolin (ANCEF) 2000 mg in sterile water 20 mL IV syringe  2,000 mg IntraVENous On Call to [address] [place] [address], MD        lactated ringers infusion   IntraVENous Continuous Ulysses Silva,  mL/hr at 21 1220 New Bag at 21 1220    sodium chloride flush 0.9 % injection 5-40 mL  5-40 mL IntraVENous 2 times per day Ulysses Silva, MD        sodium chloride flush 0.9 % injection 5-40 mL  5-40 mL IntraVENous PRN Ulysses Silva, MD           Allergies: Allergies   Allergen Reactions    Food Anaphylaxis and Swelling     Tree nuts, banana's, cabbage, cucumbers       Problem List:  There is no problem list on file for this patient. Past Medical History:        Diagnosis Date    Arthritis     R hip     Cancer [address] [address]     lung     Hypertension        Past Surgical History:        Procedure Laterality Date    IR PERCUT BX LUNG/MEDIASTINUM  2021    IR PERCUT BX LUNG/MEDIASTINUM 2021 [facility]       Social History:    Social History     Tobacco Use    Smoking status: Former Smoker     Quit date:      Years since quittin.6    Smokeless tobacco: Never Used    Tobacco comment: Quit May 2021    Substance Use Topics    Alcohol use:  Yes     Alcohol/week: 1.0 standard drinks     Types: 1 Cans of beer per week     Comment: weekend                                Counseling given: Not Answered  Comment: Quit May 2021       Vital Signs (Current):   Vitals:    21 1145   BP: 137/81   Pulse: 74   Resp: 16   Temp: 98.4 °F (36.9 °C)   TempSrc: Infrared   SpO2: 96%   Weight: 254 lb (115.2 kg)   Height: 5' 11\" (1.803 m)                                              BP Readings from Last 3 Encounters:   21 137/81   21 (!) 140/75   21 132/68       NPO Status: Time of last liquid consumption: 1000 (sips of water)                        Time of last solid consumption:                         Date of last liquid consumption: 21                        Date of last solid food consumption: 21    BMI:   Wt Readings from Last 3 Encounters:   21 254 lb (115.2 kg)   21 255 lb 9.6 oz (115.9 kg)   21 250 lb (113.4 kg) Body mass index is 35.43 kg/m². CBC:   Lab Results   Component Value Date    WBC 8.6 07/20/2021    RBC 5.33 07/20/2021    HGB 14.1 07/20/2021    HCT 44.8 07/20/2021    MCV 84.1 07/20/2021    RDW 14.2 07/20/2021     07/20/2021       CMP: No results found for: NA, K, CL, CO2, BUN, CREATININE, GFRAA, AGRATIO, LABGLOM, GLUCOSE, PROT, CALCIUM, BILITOT, ALKPHOS, AST, ALT    POC Tests: No results for input(s): POCGLU, POCNA, POCK, POCCL, POCBUN, POCHEMO, POCHCT in the last 72 hours. Coags:   Lab Results   Component Value Date    PROTIME 12.6 08/24/2021    INR 1.1 08/24/2021    APTT 26.6 08/24/2021       HCG (If Applicable): No results found for: PREGTESTUR, PREGSERUM, HCG, HCGQUANT     ABGs: No results found for: PHART, PO2ART, GDP0YUV, BRS5TXY, BEART, U0GZDUMI     Type & Screen (If Applicable):  No results found for: LABABO, LABRH    Drug/Infectious Status (If Applicable):  No results found for: HIV, HEPCAB    COVID-19 Screening (If Applicable):   Lab Results   Component Value Date    COVID19 Not Detected 08/19/2021    COVID19 Not Detected 07/16/2021           Anesthesia Evaluation  Patient summary reviewed no history of anesthetic complications:   Airway: Mallampati: II  TM distance: >3 FB   Neck ROM: full  Mouth opening: > = 3 FB Dental:          Pulmonary: breath sounds clear to auscultation      (-) not a current smoker          Patient did not smoke on day of surgery. Cardiovascular:    (+) hypertension:,         Rhythm: regular  Rate: normal                    Neuro/Psych:   Negative Neuro/Psych ROS              GI/Hepatic/Renal: Neg GI/Hepatic/Renal ROS       (-) no morbid obesity       Endo/Other:    (+) : arthritis: OA., malignancy/cancer (lung cancer). ROS comment: Marijuana use Abdominal:   (+) obese,           Vascular: negative vascular ROS. Other Findings:           Anesthesia Plan      MAC     ASA 3       Induction: intravenous.       Anesthetic plan and risks discussed with patient. Plan discussed with CRNA. DOS STAFF ADDENDUM:    Pt seen and examined, chart reviewed (including anesthesia, drug and allergy history). Anesthetic plan, risks, benefits, alternatives, and personnel involved discussed with patient. Patient verbalized an understanding and agrees to proceed. Plan discussed with care team members and agreed upon.     Emely Kay MD  Staff Anesthesiologist  12:57 PM      Emely Kay MD   8/24/2021

## 2021-08-27 ENCOUNTER — HOSPITAL ENCOUNTER (OUTPATIENT)
Dept: RADIATION ONCOLOGY | Age: 61
Discharge: HOME | End: 2021-08-27
Attending: RADIOLOGY
Payer: COMMERCIAL

## 2021-08-27 PROCEDURE — 77300 RADIATION THERAPY DOSE PLAN: CPT | Performed by: RADIOLOGY

## 2021-08-27 PROCEDURE — 77301 RADIOTHERAPY DOSE PLAN IMRT: CPT | Performed by: RADIOLOGY

## 2021-08-27 PROCEDURE — 77338 DESIGN MLC DEVICE FOR IMRT: CPT | Performed by: RADIOLOGY

## 2021-08-31 ENCOUNTER — APPOINTMENT (OUTPATIENT)
Dept: RADIATION ONCOLOGY | Age: 61
End: 2021-08-31
Attending: RADIOLOGY
Payer: COMMERCIAL

## 2021-08-31 ENCOUNTER — TELEPHONE (OUTPATIENT)
Dept: CASE MANAGEMENT | Age: 61
End: 2021-08-31

## 2021-08-31 NOTE — TELEPHONE ENCOUNTER
Notified per radiation tech that patient's radiation therapy is ready to start today. Contacted the [facility], spoke with Ani Snider, nurse, informing that patient's radiation therapy is ready to start today. She stated patient is there now for cycle 2 Taxol and Carboplatin and Dr. Amy Moran is aware he had not started his radiation therapy yet. Ani Snider said patient started cycle 1 on 08/17/2021, did not get cycle 2 on 08/24/2021 and does not know why. She stated she will inform patient to come to the clinic today right after he's done with chemo to start his radiation therapy. Informed her patient will receive a treatment calendar when he comes. Updated radiation nurse, David Perry of above. Elida Wilson RN, ADN, BSE, OCN  Patient Nurse Navigator    After call radiation  stated they just found out the plan is still in process for authorization, so paitent may or may not start radiation today. This was discussed with the radiation tech and they will be notifying Dr. Kathy Corado office. Elida Wilson RN, RYANNN, BSE, OCN  Patient Nurse Navigator

## 2021-09-01 ENCOUNTER — HOSPITAL ENCOUNTER (OUTPATIENT)
Dept: RADIATION ONCOLOGY | Age: 61
Discharge: HOME | End: 2021-09-01
Attending: RADIOLOGY
Payer: COMMERCIAL

## 2021-09-01 PROCEDURE — 77014 PR CT GUIDANCE PLACEMENT RAD THERAPY FIELDS: CPT | Performed by: RADIOLOGY

## 2021-09-01 PROCEDURE — 77386 HC NTSTY MODUL RAD TX DLVR CPLX: CPT | Performed by: RADIOLOGY

## 2021-09-02 ENCOUNTER — HOSPITAL ENCOUNTER (OUTPATIENT)
Dept: RADIATION ONCOLOGY | Age: 61
Discharge: HOME | End: 2021-09-02
Attending: RADIOLOGY
Payer: COMMERCIAL

## 2021-09-02 PROCEDURE — 77014 PR CT GUIDANCE PLACEMENT RAD THERAPY FIELDS: CPT | Performed by: RADIOLOGY

## 2021-09-02 PROCEDURE — 77386 HC NTSTY MODUL RAD TX DLVR CPLX: CPT | Performed by: RADIOLOGY

## 2021-09-03 ENCOUNTER — HOSPITAL ENCOUNTER (OUTPATIENT)
Dept: RADIATION ONCOLOGY | Age: 61
Discharge: HOME | End: 2021-09-03
Attending: RADIOLOGY
Payer: COMMERCIAL

## 2021-09-03 PROCEDURE — 77386 HC NTSTY MODUL RAD TX DLVR CPLX: CPT | Performed by: RADIOLOGY

## 2021-09-03 PROCEDURE — 77014 PR CT GUIDANCE PLACEMENT RAD THERAPY FIELDS: CPT | Performed by: RADIOLOGY

## 2021-09-07 ENCOUNTER — HOSPITAL ENCOUNTER (OUTPATIENT)
Dept: RADIATION ONCOLOGY | Age: 61
Discharge: HOME | End: 2021-09-07
Attending: RADIOLOGY
Payer: COMMERCIAL

## 2021-09-07 VITALS — BODY MASS INDEX: 35.29 KG/M2 | WEIGHT: 253 LBS

## 2021-09-07 DIAGNOSIS — C34.90 MALIGNANT NEOPLASM OF LUNG, UNSPECIFIED LATERALITY, UNSPECIFIED PART OF LUNG (HCC): Primary | ICD-10-CM

## 2021-09-07 PROCEDURE — 77014 PR CT GUIDANCE PLACEMENT RAD THERAPY FIELDS: CPT | Performed by: RADIOLOGY

## 2021-09-07 PROCEDURE — 77386 HC NTSTY MODUL RAD TX DLVR CPLX: CPT | Performed by: RADIOLOGY

## 2021-09-07 PROCEDURE — 99999 PR OFFICE/OUTPT VISIT,PROCEDURE ONLY: CPT | Performed by: RADIOLOGY

## 2021-09-07 NOTE — PROGRESS NOTES
Jannette Zapien  9/7/2021  Wt Readings from Last 3 Encounters:   09/07/21 253 lb (114.8 kg)   08/24/21 254 lb (115.2 kg)   08/12/21 255 lb 9.6 oz (115.9 kg)     Body mass index is 35.29 kg/m². Treatment Area: CTV Lung mass & LN's     Patient was seen today for weekly visit. Comfort Alteration  KPS:90%  Fatigue: Mild    Ventilation Alterations  Cough: Yes  Hemoptysis: No  Mucus Color: na  Dyspnea: yes at times   O2 Sat: 97%    Nutritional Alteration  Anorexia: No  Nausea: No   Vomiting: No     Skin Alteration   Sensation:na    Radiation Dermatitis:  na    Mucous Membrane Alteration  Voice Changes/ Stridor/Larynx: no  Pharynx & Esophagus: na    Elimination Alterations  Constipation: no  Diarrhea:  no      Emotional  Coping: effective      Injury, potential bleeding or infection: na    Other:na     Lab Results   Component Value Date    WBC 8.6 07/20/2021     07/20/2021         Wt 253 lb (114.8 kg)   BMI 35.29 kg/m²   BP within normal range? no         Assessment/Plan: Pt has completed 4/30 fractions. Pt is tolerating well.      Max Lemus RN

## 2021-09-07 NOTE — PROGRESS NOTES
DEPARTMENT OF RADIATION ONCOLOGY ON TREATMENT VISIT         9/7/2021      NAME:  Adrian Kramer    YOB: 1960    Diagnosis: lung cancer    SUBJECTIVE:   Adrian Kramer has now received fractionated external beam radiation therapy - ongoing. Past medical, surgical, social and family histories reviewed and updated as indicated. Pain: controlled    ALLERGIES:  Food         Current Outpatient Medications   Medication Sig Dispense Refill    fluticasone-salmeterol (ADVAIR) 500-50 MCG/DOSE diskus inhaler inhale 1 puff by mouth twice a day (RINSE MOUTH AFTER USE)      albuterol sulfate  (90 Base) MCG/ACT inhaler inhale 2 puffs by mouth every 6 hours if needed      ondansetron (ZOFRAN) 8 MG tablet take 1 tablet by mouth every 8 hours if needed for nausea and vomiting      dexamethasone (DECADRON) 4 MG tablet take 5 tablets (20 MILLIGRAMS) by mouth as directed 12 hours AND . ..  (REFER TO PRESCRIPTION NOTES).  losartan (COZAAR) 100 MG tablet Take 100 mg by mouth daily      predniSONE (DELTASONE) 10 MG tablet Take 10 mg by mouth every other day      ibuprofen (ADVIL;MOTRIN) 200 MG tablet Take 400 mg by mouth every 6 hours as needed for Pain      Naproxen Sodium (ALEVE PO) Take by mouth as needed      acetaminophen (TYLENOL) 500 MG tablet Take 500 mg by mouth every 6 hours as needed for Pain       No current facility-administered medications for this encounter. OBJECTIVE:  Alert and fully ambulatory. Pleasant and conversant. Physical Examination: General appearance - alert, well appearing, and in no distress. Wt Readings from Last 3 Encounters:   09/07/21 253 lb (114.8 kg)   08/24/21 254 lb (115.2 kg)   08/12/21 255 lb 9.6 oz (115.9 kg)         ASSESSMENT/PLAN:     Patient is tolerating treatments well with expected toxicities. RBA were reviewed prior to first fraction and PRN. Current and planned dose reviewed.  Goals of treatment and potential side effects were reviewed with the patient PRN. Treatment imaging has been personally reviewed for accuracy and precision. Questions answered to apparent satisfaction. Treatments will continue as planned. Dao Sheffield.  Katherine Chi MD MS DABR  Radiation Oncologist        [facility] Zaheer Dickson ([facility] [place]): [phone] /// FAX: [phone]  [facility]): [phone] /// FAX: [phone]  [address] [place] [address]): [phone] /// FAX: [phone]

## 2021-09-07 NOTE — PATIENT INSTRUCTIONS
Continue daily fractionated radiation therapy as scheduled. Please see weekly OTV note and intial consultation letter in [facility] for clinical details. Diamante Mack. Alexander Branch MD MS Bhumika Rosado:  [phone]   FAX: [phone]  [facility]:  [phone]   FAX:    [phone]  [facility]:  [phone]   FAX:  [phone]  Email: [email or web address]

## 2021-09-08 ENCOUNTER — TELEPHONE (OUTPATIENT)
Dept: CASE MANAGEMENT | Age: 61
End: 2021-09-08

## 2021-09-08 ENCOUNTER — HOSPITAL ENCOUNTER (OUTPATIENT)
Dept: RADIATION ONCOLOGY | Age: 61
Discharge: HOME | End: 2021-09-08
Attending: RADIOLOGY
Payer: COMMERCIAL

## 2021-09-08 PROBLEM — C34.91 NSCLC OF RIGHT LUNG (HCC): Status: ACTIVE | Noted: 2021-09-08

## 2021-09-08 PROCEDURE — 77336 RADIATION PHYSICS CONSULT: CPT | Performed by: RADIOLOGY

## 2021-09-08 PROCEDURE — 77386 HC NTSTY MODUL RAD TX DLVR CPLX: CPT | Performed by: RADIOLOGY

## 2021-09-08 PROCEDURE — 77014 PR CT GUIDANCE PLACEMENT RAD THERAPY FIELDS: CPT | Performed by: RADIOLOGY

## 2021-09-08 PROCEDURE — 77427 RADIATION TX MANAGEMENT X5: CPT | Performed by: RADIOLOGY

## 2021-09-08 NOTE — TELEPHONE ENCOUNTER
Met with patient after completing 5/30 radiation treatments for his squamous cell carcinoma. Informed he's scheduled to receive 30 treatments and should complete his therapy on 10/13/2021. Informed his last day of radiation he'll receive a 1 month follow up appointment with the radiation nurse practitioner. Discussed gentle skin care and applying Aquaphor to the irradiated area 2-3 times a day after radiation only. Patient follows with Dr. Dara Brothers at the [facility] and has been receiving weekly Taxol and Carboplatin treatments. He stated his next treatment will be next Tuesday. Introduced nurse navigator role, gave contact information and informed of other supportive services in clinic: , nutrition and financial navigator. Patient denied issues with living arrangements, transportation, insurance and prescription coverage. He reported his appetite as good, denying unplanned weight loss. Informed the clinic dietitian will be updated and asked to contact him for additional support, he was agreeable. Discussed and gave patient  the following materials: Patient Resource Lung Cancer booklet, cancer moality treatment information sheet and ACS Radiation Side Effects handout. Reminded patient his brain MRI is scheduled tomorrow at 1100 at [facility], he verbalized understanding. Patient denied needs today, encouraged him to call should any arise, he verbalized understanding. Guera Schaffer RN, ADN, BSE, OCN Patient Nurse Navigator

## 2021-09-08 NOTE — ADDENDUM NOTE
Encounter addended by: RAMESH Cooper CNP on: 9/8/2021 11:42 AM   Actions taken: Visit diagnoses modified, Problem List modified

## 2021-09-09 ENCOUNTER — HOSPITAL ENCOUNTER (OUTPATIENT)
Dept: MRI IMAGING | Age: 61
Discharge: HOME | End: 2021-09-11
Payer: COMMERCIAL

## 2021-09-09 ENCOUNTER — HOSPITAL ENCOUNTER (OUTPATIENT)
Dept: RADIATION ONCOLOGY | Age: 61
Discharge: HOME | End: 2021-09-09
Attending: RADIOLOGY
Payer: COMMERCIAL

## 2021-09-09 DIAGNOSIS — C34.11 MALIGNANT NEOPLASM OF UPPER LOBE OF RIGHT LUNG (HCC): ICD-10-CM

## 2021-09-09 DIAGNOSIS — C79.31 SECONDARY MALIGNANT NEOPLASM OF BRAIN AND SPINAL CORD (HCC): ICD-10-CM

## 2021-09-09 DIAGNOSIS — C79.49 SECONDARY MALIGNANT NEOPLASM OF BRAIN AND SPINAL CORD (HCC): ICD-10-CM

## 2021-09-09 PROCEDURE — 70553 MRI BRAIN STEM W/O & W/DYE: CPT

## 2021-09-09 PROCEDURE — 6360000004 HC RX CONTRAST MEDICATION: Performed by: RADIOLOGY

## 2021-09-09 PROCEDURE — 77014 PR CT GUIDANCE PLACEMENT RAD THERAPY FIELDS: CPT | Performed by: RADIOLOGY

## 2021-09-09 PROCEDURE — A9577 INJ MULTIHANCE: HCPCS | Performed by: RADIOLOGY

## 2021-09-09 PROCEDURE — 77386 HC NTSTY MODUL RAD TX DLVR CPLX: CPT | Performed by: RADIOLOGY

## 2021-09-09 RX ADMIN — GADOBENATE DIMEGLUMINE 20 ML: 529 INJECTION, SOLUTION INTRAVENOUS at 10:31

## 2021-09-10 ENCOUNTER — IMMUNIZATION (OUTPATIENT)
Dept: PRIMARY CARE CLINIC | Age: 61
End: 2021-09-10
Payer: COMMERCIAL

## 2021-09-10 ENCOUNTER — TELEPHONE (OUTPATIENT)
Dept: RADIATION ONCOLOGY | Age: 61
End: 2021-09-10

## 2021-09-10 ENCOUNTER — HOSPITAL ENCOUNTER (OUTPATIENT)
Dept: RADIATION ONCOLOGY | Age: 61
Discharge: HOME | End: 2021-09-10
Attending: RADIOLOGY
Payer: COMMERCIAL

## 2021-09-10 PROCEDURE — 0011A COVID-19, MODERNA VACCINE 100MCG/0.5ML DOSE: CPT | Performed by: NURSE PRACTITIONER

## 2021-09-10 PROCEDURE — 91301 COVID-19, MODERNA VACCINE 100MCG/0.5ML DOSE: CPT | Performed by: NURSE PRACTITIONER

## 2021-09-10 PROCEDURE — 77014 PR CT GUIDANCE PLACEMENT RAD THERAPY FIELDS: CPT | Performed by: RADIOLOGY

## 2021-09-10 PROCEDURE — 77386 HC NTSTY MODUL RAD TX DLVR CPLX: CPT | Performed by: RADIOLOGY

## 2021-09-10 NOTE — TELEPHONE ENCOUNTER
Attempted to contact pt per referral for nutrition support. Left message encouraging pt to return call. If call is not returned will attempt to meet face-to-face after appointment next week.  Electronically signed by Lia Michael RD, LD on 9/10/2021 at 2:47 PM

## 2021-09-13 ENCOUNTER — HOSPITAL ENCOUNTER (OUTPATIENT)
Dept: RADIATION ONCOLOGY | Age: 61
Discharge: HOME | End: 2021-09-13
Attending: RADIOLOGY
Payer: COMMERCIAL

## 2021-09-13 PROCEDURE — 77386 HC NTSTY MODUL RAD TX DLVR CPLX: CPT | Performed by: RADIOLOGY

## 2021-09-13 PROCEDURE — 77014 PR CT GUIDANCE PLACEMENT RAD THERAPY FIELDS: CPT | Performed by: RADIOLOGY

## 2021-09-13 RX ORDER — SUCRALFATE 1 G/1
1 TABLET ORAL 4 TIMES DAILY
Qty: 120 TABLET | Refills: 3 | Status: SHIPPED | OUTPATIENT
Start: 2021-09-13 | End: 2021-12-22

## 2021-09-14 ENCOUNTER — HOSPITAL ENCOUNTER (OUTPATIENT)
Dept: RADIATION ONCOLOGY | Age: 61
Discharge: HOME | End: 2021-09-14
Attending: RADIOLOGY
Payer: COMMERCIAL

## 2021-09-14 VITALS
DIASTOLIC BLOOD PRESSURE: 80 MMHG | SYSTOLIC BLOOD PRESSURE: 140 MMHG | TEMPERATURE: 97.9 F | HEART RATE: 100 BPM | RESPIRATION RATE: 16 BRPM

## 2021-09-14 PROCEDURE — 77386 HC NTSTY MODUL RAD TX DLVR CPLX: CPT | Performed by: RADIOLOGY

## 2021-09-14 PROCEDURE — 77014 PR CT GUIDANCE PLACEMENT RAD THERAPY FIELDS: CPT | Performed by: RADIOLOGY

## 2021-09-14 NOTE — PROGRESS NOTES
DEPARTMENT OF RADIATION ONCOLOGY   ON TREATMENT VISIT       9/14/2021      NAME:  Bebo Escobar    YOB: 1960    DIAGNOSIS:  Carcinoma of right upper lung     SUBJECTIVE:      C/O fatigue     Works at night in house keeping      Bebo Escobar has now received 1800 cGy in 9 fractions directed to the     primary in the right upper lung . Past medical, surgical, social and family histories reviewed and updated as indicated. PAIN: Dysphagia  Causing loss of appetite and weight . ALLERGIES:  Food  lactose intolerance       Medications : Liquid Carafate for dysphagia . OBJECTIVE:     Alert and fully ambulatory. Pleasant and conversant. Not SOB at rest       Physical Examination:{    Constitutional: A well developed, well nourished 62 y.o. male who is alert, oriented, cooperative and in no apparent distress. HEENT:  Cardiovascular: Regular without murmur. Skin:  Warm and dry. No obvious rashes. No palpable neck nodes . Lungs good air entry to auscultation . Vitals:    09/14/21 0914   BP: (!) 140/80   Pulse: 100   Resp: 16   Temp: 97.9 °F (36.6 °C)   TempSrc: Infrared       Wt Readings from Last 3 Encounters:   09/07/21 253 lb (114.8 kg)   08/24/21 254 lb (115.2 kg)   08/12/21 255 lb 9.6 oz (115.9 kg)       ASSESSMENT/PLAN:     Patient is tolerating treatments well with expected toxicities. Dysphagia improved with antacids. Current and planned dose reviewed. Goals of treatment and potential side effects were reviewed with the patient. Treatment imaging has been personally reviewed for accuracy and precision. Questions answered to apparent satisfaction. Recommend if possible light duty  ,less exposure to dust     Treatments will continue as planned. Dietitian had a consultation this morning as well . Thank you for the opportunity to participate in multidisciplinary management of this     remarkable and pleasant patient.       Shelly Peck MD [address] [facility] Rad[facility]  [facility]) [facility]: [phone] (OHK: [phone])  [address] [facility]) [facility]: [phone] (PDV: [phone])  [address] [facility]) [facility]:  [phone] (KKZ:  [phone])

## 2021-09-14 NOTE — PROGRESS NOTES
Eulance Number  9/14/2021  Wt Readings from Last 10 Encounters:   09/07/21 253 lb (114.8 kg)   08/24/21 254 lb (115.2 kg)   08/12/21 255 lb 9.6 oz (115.9 kg)   07/20/21 250 lb (113.4 kg)   07/19/21 250 lb (113.4 kg)     Ht Readings from Last 1 Encounters:   08/24/21 5' 11\" (1.803 m)     Met with pt today per referral and for initial visit/introductions. Pt has several food allergies; banana, raw cabbage, raw cucumber, and tree nuts. Pt is s/p 9/30 XRT with concurrent Carboplatin and Taxol for lung cancer. Pt reported a good appetite and noted inconsistent intake as he eats when he is hungry. Discussed not waiting for hunger cues and attempting intake at least 5 times daily with 3 meals and 2 snacks/ONS daily. Pt is drinking Premier Protein periodically. Discussed adding calorie source to [employer] Protein such as heavy whipping cream or making a shake with Premier Protein as the base. He estimated that he is drinking at least 64 oz of fluid daily and noted a portion of this intake being electrolyte beverages such as Gatorade, Liquid IV, and DripDrop. He commented that he is also trying to eat healthy with increased fruits and vegetables but that this is causing increased odynophagia/reflux. Discussed including cooked fruits and vegetables to reduce irritation. Also encouraged inclusion of high calorie/protein foods with produce intake. Provided pt with and discussed high calorie/protein handouts. All questions were answered and pt was provided with this clinician's contact information. Weight change: RN noted 6 lb wt loss from last week  Appetite: good  N/V/D/C: none  Calculated Needs if applicable: Using ABW= 441.6 lb (87.4 kg)  3758-8771 kcal (30-32 kcal/kg), 115-135 gm protein (1.3-1.5 gm/kg), 2800 ml FW (32 ml/kg)    Recommendations: Pt is to consume at least 3 meals and 2 snacks/ONS with high calorie/protein foods. He is also to drink at least 84 oz of fluid daily.      [facility] GUIDELINES FOR CLINICAL CHARACTERISTICS OF MALNUTRITION IN CHRONIC ILLNESS     Moderate Malnutrition  Severe Malnutrition    Energy intake  <75% energy intake compared to estimated needs for >1month <75% energy intake compared to estimated needs for >1month   Weight changes  5% x 1 month  7.5% x 3 months   10% x 6 months   20% x 1 year  >5% x 1 month  >7.5% x 3 months   >10% x 6 months   >20% x 1 year    Physical findings  Mild   Decrease subcutaneous fat    Decrease muscle mass     Increase fluid/edema   Severe  Decrease subcutaneous fat    Decrease muscle mass     Increase fluid/edema    Pt is at risk for malnutrition AEB 6 lb wt loss in 1 week.     Andressa Elam, BIJU, LD,RD,LD

## 2021-09-14 NOTE — PROGRESS NOTES
Clearnce Number  9/14/2021  Wt Readings from Last 3 Encounters:   09/07/21 253 lb (114.8 kg)   08/24/21 254 lb (115.2 kg)   08/12/21 255 lb 9.6 oz (115.9 kg)     There is no height or weight on file to calculate BMI. Treatment Area: CTV lung mass & LN's    Patient was seen today for weekly visit. Comfort Alteration  KPS:80%  Fatigue: Mild    Ventilation Alterations  Cough: Yes  Hemoptysis: No  Mucus Color: white tan  Dyspnea: Yes  O2 Sat: 97%    Nutritional Alteration  Anorexia: No  Nausea: No   Vomiting: No     Skin Alteration   Sensation:na    Radiation Dermatitis:  na    Mucous Membrane Alteration  Voice Changes/ Stridor/Larynx: no  Pharynx & Esophagus: Yes- carafate    Elimination Alterations  Constipation: no  Diarrhea:  no      Emotional  Coping: effective      Injury, potential bleeding or infection: na    Other:NA     Lab Results   Component Value Date    WBC 8.6 07/20/2021     07/20/2021         BP (!) 140/80   Pulse 100   Temp 97.9 °F (36.6 °C) (Infrared)   Resp 16   BP within normal range? yes         Assessment/Plan: Pt has completed 9/30 fractions. Pt is down 6 pounds. States that he is staying hydrated. States that he is becoming more fatigued. Pt is still working. Overall tolerating well.    Mauro Villar RN

## 2021-09-15 ENCOUNTER — HOSPITAL ENCOUNTER (OUTPATIENT)
Dept: RADIATION ONCOLOGY | Age: 61
Discharge: HOME | End: 2021-09-15
Attending: RADIOLOGY
Payer: COMMERCIAL

## 2021-09-15 PROCEDURE — 77336 RADIATION PHYSICS CONSULT: CPT | Performed by: RADIOLOGY

## 2021-09-15 PROCEDURE — 77386 HC NTSTY MODUL RAD TX DLVR CPLX: CPT | Performed by: RADIOLOGY

## 2021-09-15 PROCEDURE — 77014 PR CT GUIDANCE PLACEMENT RAD THERAPY FIELDS: CPT | Performed by: RADIOLOGY

## 2021-09-15 PROCEDURE — 77427 RADIATION TX MANAGEMENT X5: CPT | Performed by: RADIOLOGY

## 2021-09-16 ENCOUNTER — HOSPITAL ENCOUNTER (OUTPATIENT)
Dept: RADIATION ONCOLOGY | Age: 61
Discharge: HOME | End: 2021-09-16
Attending: RADIOLOGY
Payer: COMMERCIAL

## 2021-09-16 PROCEDURE — 77014 PR CT GUIDANCE PLACEMENT RAD THERAPY FIELDS: CPT | Performed by: RADIOLOGY

## 2021-09-16 PROCEDURE — 77386 HC NTSTY MODUL RAD TX DLVR CPLX: CPT | Performed by: RADIOLOGY

## 2021-09-17 ENCOUNTER — HOSPITAL ENCOUNTER (OUTPATIENT)
Dept: RADIATION ONCOLOGY | Age: 61
Discharge: HOME | End: 2021-09-17
Attending: RADIOLOGY
Payer: COMMERCIAL

## 2021-09-17 PROCEDURE — 77386 HC NTSTY MODUL RAD TX DLVR CPLX: CPT | Performed by: RADIOLOGY

## 2021-09-17 PROCEDURE — 77014 PR CT GUIDANCE PLACEMENT RAD THERAPY FIELDS: CPT | Performed by: RADIOLOGY

## 2021-09-20 ENCOUNTER — HOSPITAL ENCOUNTER (OUTPATIENT)
Dept: RADIATION ONCOLOGY | Age: 61
Discharge: HOME | End: 2021-09-20
Attending: RADIOLOGY
Payer: COMMERCIAL

## 2021-09-20 PROCEDURE — 77386 HC NTSTY MODUL RAD TX DLVR CPLX: CPT | Performed by: RADIOLOGY

## 2021-09-20 PROCEDURE — 77014 PR CT GUIDANCE PLACEMENT RAD THERAPY FIELDS: CPT | Performed by: RADIOLOGY

## 2021-09-21 ENCOUNTER — HOSPITAL ENCOUNTER (OUTPATIENT)
Dept: RADIATION ONCOLOGY | Age: 61
Discharge: HOME | End: 2021-09-21
Attending: RADIOLOGY
Payer: COMMERCIAL

## 2021-09-21 ENCOUNTER — APPOINTMENT (OUTPATIENT)
Dept: RADIATION ONCOLOGY | Age: 61
End: 2021-09-21
Attending: RADIOLOGY
Payer: COMMERCIAL

## 2021-09-21 PROCEDURE — 77014 PR CT GUIDANCE PLACEMENT RAD THERAPY FIELDS: CPT | Performed by: RADIOLOGY

## 2021-09-21 PROCEDURE — 77386 HC NTSTY MODUL RAD TX DLVR CPLX: CPT | Performed by: RADIOLOGY

## 2021-09-22 ENCOUNTER — HOSPITAL ENCOUNTER (OUTPATIENT)
Dept: RADIATION ONCOLOGY | Age: 61
Discharge: HOME | End: 2021-09-22
Attending: RADIOLOGY
Payer: COMMERCIAL

## 2021-09-22 PROCEDURE — 77014 PR CT GUIDANCE PLACEMENT RAD THERAPY FIELDS: CPT | Performed by: RADIOLOGY

## 2021-09-22 PROCEDURE — 77386 HC NTSTY MODUL RAD TX DLVR CPLX: CPT | Performed by: RADIOLOGY

## 2021-09-22 PROCEDURE — 77336 RADIATION PHYSICS CONSULT: CPT | Performed by: RADIOLOGY

## 2021-09-23 ENCOUNTER — HOSPITAL ENCOUNTER (OUTPATIENT)
Dept: RADIATION ONCOLOGY | Age: 61
Discharge: HOME | End: 2021-09-23
Attending: RADIOLOGY
Payer: COMMERCIAL

## 2021-09-23 VITALS
BODY MASS INDEX: 33.81 KG/M2 | SYSTOLIC BLOOD PRESSURE: 132 MMHG | TEMPERATURE: 98.7 F | DIASTOLIC BLOOD PRESSURE: 81 MMHG | OXYGEN SATURATION: 100 % | RESPIRATION RATE: 16 BRPM | WEIGHT: 242.4 LBS

## 2021-09-23 PROCEDURE — 77386 HC NTSTY MODUL RAD TX DLVR CPLX: CPT | Performed by: RADIOLOGY

## 2021-09-23 PROCEDURE — 99999 PR OFFICE/OUTPT VISIT,PROCEDURE ONLY: CPT | Performed by: RADIOLOGY

## 2021-09-23 PROCEDURE — 77014 PR CT GUIDANCE PLACEMENT RAD THERAPY FIELDS: CPT | Performed by: RADIOLOGY

## 2021-09-23 ASSESSMENT — PAIN SCALES - GENERAL: PAINLEVEL_OUTOF10: 4

## 2021-09-23 NOTE — PROGRESS NOTES
DEPARTMENT OF RADIATION ONCOLOGY   ON TREATMENT VISIT       9/23/2021      NAME:  Antoni Childs    YOB: 1960    DIAGNOSIS:     SUBJECTIVE:     Antoni Childs has now received 3200 cGy in 16  fractions directed to the right lower lung, right hilum and mediastinum. Past medical, surgical, social and family histories reviewed and updated as indicated. PAIN: Denies any pain or shortness of breath at rest.  No worsening of cough. ALLERGIES:  Food         Current Outpatient Medications   Medication Sig Dispense Refill    sucralfate (CARAFATE) 1 GM tablet Take 1 tablet by mouth 4 times daily Dissolve tablet n small amount of H20 & drink. 120 tablet 3    fluticasone-salmeterol (ADVAIR) 500-50 MCG/DOSE diskus inhaler inhale 1 puff by mouth twice a day (RINSE MOUTH AFTER USE)      albuterol sulfate  (90 Base) MCG/ACT inhaler inhale 2 puffs by mouth every 6 hours if needed      ondansetron (ZOFRAN) 8 MG tablet take 1 tablet by mouth every 8 hours if needed for nausea and vomiting      dexamethasone (DECADRON) 4 MG tablet take 5 tablets (20 MILLIGRAMS) by mouth as directed 12 hours AND . ..  (REFER TO PRESCRIPTION NOTES).  losartan (COZAAR) 100 MG tablet Take 100 mg by mouth daily      predniSONE (DELTASONE) 10 MG tablet Take 10 mg by mouth every other day      ibuprofen (ADVIL;MOTRIN) 200 MG tablet Take 400 mg by mouth every 6 hours as needed for Pain      Naproxen Sodium (ALEVE PO) Take by mouth as needed      acetaminophen (TYLENOL) 500 MG tablet Take 500 mg by mouth every 6 hours as needed for Pain       No current facility-administered medications for this encounter. OBJECTIVE:  Alert and fully ambulatory. Pleasant and conversant. CBCT good radiological response noted.   Tumor  Shrinkage noted on daily CBCT    Physical Examination:    Constitutional: A well developed, well nourished 62 y.o. male who is alert, oriented, cooperative and in no apparent distress. HEENT:  No palpable lymph nodes in the lymph joon area. Lungs are clear to auscultation. There is no skin changes in the treatment port. Cardiovascular: Regular without murmur. Skin:  Warm and dry. No obvious rashes. Vitals:    09/23/21 0908   BP: 132/81   Resp: 16   Temp: 98.7 °F (37.1 °C)   TempSrc: Infrared   SpO2: 100%   Weight: 242 lb 6.4 oz (110 kg)       Wt Readings from Last 3 Encounters:   09/23/21 242 lb 6.4 oz (110 kg)   09/07/21 253 lb (114.8 kg)   08/24/21 254 lb (115.2 kg)       ASSESSMENT/PLAN:     Patient is tolerating treatments well with expected toxicities. Current and planned dose reviewed. Goals of treatment and potential side effects were reviewed with the patient. Treatment imaging has been personally reviewed for accuracy and precision. Questions answered to apparent satisfaction. Treatments will continue as planned. Thank you for the opportunity to participate in multidisciplinary management of this remarkable and pleasant patient.       Cheryle Dykes, MDFA    Department of Radiation Oncology  [facility]) [facility]: [phone] (BAX: [phone])  [facility]) [facility]: [phone] (OIQ: [phone])  [facility]) [facility]:  [phone] (DOL:  [phone])

## 2021-09-23 NOTE — PROGRESS NOTES
Elen Howard  9/23/2021  Wt Readings from Last 3 Encounters:   09/23/21 242 lb 6.4 oz (110 kg)   09/07/21 253 lb (114.8 kg)   08/24/21 254 lb (115.2 kg)     Body mass index is 33.81 kg/m². Treatment Area: CTV Lung mass & LN's     Patient was seen today for weekly visit. Comfort Alteration  KPS:90%  Fatigue: Mild    Ventilation Alterations  Cough: Yes  Hemoptysis: No  Mucus Color: na  Dyspnea: Yes  O2 Sat: 100%    Nutritional Alteration  Anorexia: No  Nausea: No   Vomiting: No     Skin Alteration   Sensation:na    Radiation Dermatitis:  na    Mucous Membrane Alteration  Voice Changes/ Stridor/Larynx: no  Pharynx & Esophagus: esophagitis     Elimination Alterations  Constipation: no  Diarrhea:  no      Emotional  Coping: effective      Injury, potential bleeding or infection: na    Other:na    Lab Results   Component Value Date    WBC 8.6 07/20/2021     07/20/2021         /81   Temp 98.7 °F (37.1 °C) (Infrared)   Resp 16   Wt 242 lb 6.4 oz (110 kg)   SpO2 100%   BMI 33.81 kg/m²   BP within normal range? yes         Assessment/Plan: pt has completed 16/30 fractions. Pt is overall doing well. He is down 11# in 2 weeks; pt states that he eats 3 full meals a day, drinks at least 3 32 oz bottles of smart water & takes a protein supplement. He states that he is a big eater & doesn't understand why he is losing weight. I will notify dietitian & also gave pt a sample pack of ensure & coupons.      Pedro Brown RN

## 2021-09-24 ENCOUNTER — HOSPITAL ENCOUNTER (OUTPATIENT)
Dept: RADIATION ONCOLOGY | Age: 61
Discharge: HOME | End: 2021-09-24
Attending: RADIOLOGY
Payer: COMMERCIAL

## 2021-09-24 PROCEDURE — 77386 HC NTSTY MODUL RAD TX DLVR CPLX: CPT | Performed by: RADIOLOGY

## 2021-09-24 PROCEDURE — 77014 PR CT GUIDANCE PLACEMENT RAD THERAPY FIELDS: CPT | Performed by: RADIOLOGY

## 2021-09-27 ENCOUNTER — HOSPITAL ENCOUNTER (OUTPATIENT)
Dept: RADIATION ONCOLOGY | Age: 61
Discharge: HOME | End: 2021-09-27
Attending: RADIOLOGY
Payer: COMMERCIAL

## 2021-09-27 PROCEDURE — 77386 HC NTSTY MODUL RAD TX DLVR CPLX: CPT | Performed by: RADIOLOGY

## 2021-09-27 PROCEDURE — 77014 PR CT GUIDANCE PLACEMENT RAD THERAPY FIELDS: CPT | Performed by: RADIOLOGY

## 2021-09-28 ENCOUNTER — HOSPITAL ENCOUNTER (OUTPATIENT)
Dept: RADIATION ONCOLOGY | Age: 61
Discharge: HOME | End: 2021-09-28
Attending: RADIOLOGY
Payer: COMMERCIAL

## 2021-09-28 ENCOUNTER — TELEPHONE (OUTPATIENT)
Dept: INFUSION THERAPY | Age: 61
End: 2021-09-28

## 2021-09-28 ENCOUNTER — TELEPHONE (OUTPATIENT)
Dept: RADIATION ONCOLOGY | Age: 61
End: 2021-09-28

## 2021-09-28 VITALS
WEIGHT: 233.38 LBS | BODY MASS INDEX: 32.55 KG/M2 | RESPIRATION RATE: 20 BRPM | TEMPERATURE: 97.1 F | DIASTOLIC BLOOD PRESSURE: 72 MMHG | HEART RATE: 111 BPM | SYSTOLIC BLOOD PRESSURE: 114 MMHG | OXYGEN SATURATION: 98 %

## 2021-09-28 PROCEDURE — 77386 HC NTSTY MODUL RAD TX DLVR CPLX: CPT | Performed by: RADIOLOGY

## 2021-09-28 PROCEDURE — 77014 PR CT GUIDANCE PLACEMENT RAD THERAPY FIELDS: CPT | Performed by: RADIOLOGY

## 2021-09-28 ASSESSMENT — PAIN SCALES - GENERAL: PAINLEVEL_OUTOF10: 7

## 2021-09-28 ASSESSMENT — PAIN DESCRIPTION - DESCRIPTORS: DESCRIPTORS: BURNING

## 2021-09-28 ASSESSMENT — PAIN DESCRIPTION - LOCATION: LOCATION: CHEST

## 2021-09-28 ASSESSMENT — PAIN DESCRIPTION - ORIENTATION: ORIENTATION: MID

## 2021-09-28 ASSESSMENT — PAIN DESCRIPTION - FREQUENCY: FREQUENCY: CONTINUOUS

## 2021-09-28 NOTE — PROGRESS NOTES
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 SUBJECTIVE:   Tera Castano has now received 3800 cGy of the planned 6000 cGy in 19 fractions aimed at the primary in the lung and adjacent lymph joon area in the mediastinum. Past medical, surgical, social and family histories reviewed and updated as indicated. PAIN: Dysphagia is severe, causing significant weight loss. ALLERGIES:  Food         Current Outpatient Medications   Medication Sig Dispense Refill    sucralfate (CARAFATE) 1 GM tablet Take 1 tablet by mouth 4 times daily Dissolve tablet n small amount of H20 & drink. 120 tablet 3    fluticasone-salmeterol (ADVAIR) 500-50 MCG/DOSE diskus inhaler inhale 1 puff by mouth twice a day (RINSE MOUTH AFTER USE)      albuterol sulfate  (90 Base) MCG/ACT inhaler inhale 2 puffs by mouth every 6 hours if needed      ondansetron (ZOFRAN) 8 MG tablet take 1 tablet by mouth every 8 hours if needed for nausea and vomiting      dexamethasone (DECADRON) 4 MG tablet take 5 tablets (20 MILLIGRAMS) by mouth as directed 12 hours AND . ..  (REFER TO PRESCRIPTION NOTES).  losartan (COZAAR) 100 MG tablet Take 100 mg by mouth daily      predniSONE (DELTASONE) 10 MG tablet Take 10 mg by mouth every other day      ibuprofen (ADVIL;MOTRIN) 200 MG tablet Take 400 mg by mouth every 6 hours as needed for Pain      Naproxen Sodium (ALEVE PO) Take by mouth as needed      acetaminophen (TYLENOL) 500 MG tablet Take 500 mg by mouth every 6 hours as needed for Pain       No current facility-administered medications for this encounter. OBJECTIVE:  Alert and fully ambulatory. Pleasant and conversant. Physical Examination: Lost about 9 pounds since the last visit.   Constitutional: A well developed, well nourished 62 y.o. male who is alert, oriented, cooperative and in no apparent distress. HEENT: No palpable lymph nodes in the lymph joon area. Cardiovascular: Regular without murmur. Lungs are clear to auscultation. Skin:  Warm and dry. No obvious rashes. There were no vitals filed for this visit. Wt Readings from Last 3 Encounters:   09/28/21 233 lb 6 oz (105.9 kg)   09/23/21 242 lb 6.4 oz (110 kg)   09/07/21 253 lb (114.8 kg)       ASSESSMENT/PLAN:     Patient is tolerating treatments well with expected toxicities. We had a lengthy visit regarding the diet. He was also advised to increase the number of times he takes liquid Carafate. Current and planned dose reviewed. Goals of treatment and potential side effects were reviewed with the patient. Treatment imaging has been personally reviewed for accuracy and precision. Questions answered to apparent satisfaction. Treatments will continue as planned. Thank you for the opportunity to participate in multidisciplinary management of this remarkable and pleasant patient.       Daksha Russell MD [facility]    Department of Radiation Oncology  [facility]) [facility]: [phone] ([facility]: [phone])  [address] [place] [address]) [facility]: [phone] (S: [phone])  [facility]) [facility]:  [phone] ([facility]:  [phone])

## 2021-09-28 NOTE — TELEPHONE ENCOUNTER
This Rad/Onc RN called Dr Mino Berry office requested to talk to a nurse, no one is available, left message to [facility]  re:  Dr Beverley Calvillo would like pt to get hydration today, she verbalizes understanding. I also gave her my # to have one of the nurses call me back.

## 2021-09-28 NOTE — PROGRESS NOTES
Elba Arredondo  9/28/2021  Wt Readings from Last 3 Encounters:   09/28/21 233 lb 6 oz (105.9 kg)   09/23/21 242 lb 6.4 oz (110 kg)   09/07/21 253 lb (114.8 kg)     Body mass index is 32.55 kg/m². Treatment Area:CTV lung mass & LN's    Patient was seen today for weekly visit. Comfort Alteration  KPS:90%  Fatigue: Moderate    Ventilation Alterations  Cough: Yes  Hemoptysis: No  Mucus Color: white  Dyspnea: Yes/ sometimes  O2 Sat: 98%    Nutritional Alteration  Anorexia: No/ good appetite but c/o difficulty swallowing  Nausea: Yes / sometimes but doesn't last  Vomiting: No     Skin Alteration   Sensation:na    Radiation Dermatitis:  None, educated to continue moisturizing skin to RT site    Mucous Membrane Alteration  Voice Changes/ Stridor/Larynx: no  Pharynx & Esophagus: na    Elimination Alterations  Constipation: no  Diarrhea:  no      Emotional  Coping: effective      Injury, potential bleeding or infection: na    Other:na    Lab Results   Component Value Date    WBC 8.6 07/20/2021     07/20/2021         /72   Pulse 111   Temp 97.1 °F (36.2 °C) (Temporal)   Resp 20   Wt 233 lb 6 oz (105.9 kg)   SpO2 98%   BMI 32.55 kg/m²   BP within normal range? yes       Assessment/Plan:  Completed 19/30 fractions; 3800/6000 cGy. Patient lost about 9 lbs since last week R/T burning sensation to mid chest area and difficulty swallowing, Dr Kanwal Burgos updated. I will notify dietician also.     Colonel Reyes, RN

## 2021-09-28 NOTE — TELEPHONE ENCOUNTER
Alka Covert  9/28/2021  Wt Readings from Last 10 Encounters:   09/28/21 233 lb 6 oz (105.9 kg)   09/23/21 242 lb 6.4 oz (110 kg)   09/07/21 253 lb (114.8 kg)   08/24/21 254 lb (115.2 kg)   08/12/21 255 lb 9.6 oz (115.9 kg)   07/20/21 250 lb (113.4 kg)   07/19/21 250 lb (113.4 kg)     Ht Readings from Last 1 Encounters:   08/24/21 5' 11\" (1.803 m)     There is no height or weight on file to calculate BMI. Contacted patient today for follow up due to recent weight loss with concurrent chemoradiation. He reports significant esophagitis, he has lost 20# in just 3 weeks. He notes that everything is very painful to swallow. The easiest thing is soup, he likes chicken and dumpling or chicken noodle. He reports that he has difficulty with plain water and the supplemental shakes. He was doing some premier protein, but he is planning on trying the Ensure [place] that were given to him today. He reports that he is doing the Carafate, but it doesn't really seem to help. He denies significant n/v/d/c. He was just finishing his chemotherapy. Spoke with patient about the importance of his nutrition at this time. He was encouraged to try to get in 3 Ensure [place] daily. Will also pass on Boost VHC to try, for this would help maximize his calories PO. He was encouraged to try drinking broth for fluid, and aiming for soups for all meals, if this is what he tolerates. Encouraged trying to heat his Ensure slightly for tolerance. Will plan to meet with patient this week to provide more samples and educational resources. Weight change: 20# loss in <1mo (8%)  Appetite: poor  N/V/D/C: none  Calculated Needs if applicable: AEV=465; 63-6070BYRR (89x30-32), 115-15gm PRO (89x1.3), 2800ml (89x32)    Pre-Hab Eligible?: yes        Recommendations: Ensure Enlive TID to provide 1080kcal, 60gm PRO;  Add soup, noodles, broth and warmed supplemental shakes between supplements; Provide education for fortification of foods to meet needs; Consider Boost VHC for even more calories/ml. [employer] GUIDELINES FOR CLINICAL CHARACTERISTICS OF MALNUTRITION IN CHRONIC ILLNESS     Moderate Malnutrition  Severe Malnutrition    Energy intake  <75% energy intake compared to estimated needs for >1month <75% energy intake compared to estimated needs for >1month   Weight changes  5% x 1 month  7.5% x 3 months   10% x 6 months   20% x 1 year  >5% x 1 month  >7.5% x 3 months   >10% x 6 months   >20% x 1 year    Physical findings  Mild   Decrease subcutaneous fat    Decrease muscle mass     Increase fluid/edema   Severe  Decrease subcutaneous fat    Decrease muscle mass     Increase fluid/edema      Moderate-severe malnutrition evidenced by 8% weight loss in 1mo, <75% of needs x1mo.     Dipti Brice RD, LD,RD,LD

## 2021-09-29 ENCOUNTER — HOSPITAL ENCOUNTER (OUTPATIENT)
Dept: RADIATION ONCOLOGY | Age: 61
Discharge: HOME | End: 2021-09-29
Attending: RADIOLOGY
Payer: COMMERCIAL

## 2021-09-29 PROCEDURE — 77386 HC NTSTY MODUL RAD TX DLVR CPLX: CPT | Performed by: RADIOLOGY

## 2021-09-29 PROCEDURE — 77014 PR CT GUIDANCE PLACEMENT RAD THERAPY FIELDS: CPT | Performed by: RADIOLOGY

## 2021-09-29 PROCEDURE — 77336 RADIATION PHYSICS CONSULT: CPT | Performed by: RADIOLOGY

## 2021-09-30 ENCOUNTER — TELEPHONE (OUTPATIENT)
Dept: INFUSION THERAPY | Age: 61
End: 2021-09-30

## 2021-09-30 ENCOUNTER — HOSPITAL ENCOUNTER (OUTPATIENT)
Dept: RADIATION ONCOLOGY | Age: 61
Discharge: HOME | End: 2021-09-30
Attending: RADIOLOGY
Payer: COMMERCIAL

## 2021-09-30 PROCEDURE — 77386 HC NTSTY MODUL RAD TX DLVR CPLX: CPT | Performed by: RADIOLOGY

## 2021-09-30 PROCEDURE — 77427 RADIATION TX MANAGEMENT X5: CPT | Performed by: RADIOLOGY

## 2021-09-30 PROCEDURE — 77014 PR CT GUIDANCE PLACEMENT RAD THERAPY FIELDS: CPT | Performed by: RADIOLOGY

## 2021-09-30 NOTE — TELEPHONE ENCOUNTER
Salvatore Cole  9/30/2021  Wt Readings from Last 10 Encounters:   09/28/21 233 lb 6 oz (105.9 kg)   09/23/21 242 lb 6.4 oz (110 kg)   09/07/21 253 lb (114.8 kg)   08/24/21 254 lb (115.2 kg)   08/12/21 255 lb 9.6 oz (115.9 kg)   07/20/21 250 lb (113.4 kg)   07/19/21 250 lb (113.4 kg)     Ht Readings from Last 1 Encounters:   08/24/21 5' 11\" (1.803 m)     There is no height or weight on file to calculate BMI. Met with patient today per referral, re: weight loss and chemoradiation. He is having significant odynophagia and trouble with eating. Provided him with numerous education materials with recipes on creating protein shakes utilizing the Ensure, boost or other protein powder. Suggested using a protein powder that is flavored like chicken noodle soup, for this is what is going down well for him. He was instructed to add the powder to his soup, then blend as needed for consistency. He was encouraged to use his MM about 15 minutes prior to eating for effectiveness. Also spoke with patient about a feeding tube, for he may require one due to his weight loss and difficulty eating. He is declining at this time, encouraged him to use this as motivation to force himself to eat and drink his shakes as recommended. Continue to follow regularly.           [employer] GUIDELINES FOR CLINICAL CHARACTERISTICS OF MALNUTRITION IN CHRONIC ILLNESS     Moderate Malnutrition  Severe Malnutrition    Energy intake  <75% energy intake compared to estimated needs for >1month <75% energy intake compared to estimated needs for >1month   Weight changes  5% x 1 month  7.5% x 3 months   10% x 6 months   20% x 1 year  >5% x 1 month  >7.5% x 3 months   >10% x 6 months   >20% x 1 year    Physical findings  Mild   Decrease subcutaneous fat    Decrease muscle mass     Increase fluid/edema   Severe  Decrease subcutaneous fat    Decrease muscle mass     Increase fluid/edema      Moderate malnutrition evidenced by 8% weight loss in 1mo, <75% of needs x1-2 weeks.      Blanco Camargo RD, LD,RD,LD

## 2021-10-01 ENCOUNTER — HOSPITAL ENCOUNTER (OUTPATIENT)
Dept: RADIATION ONCOLOGY | Age: 61
Discharge: HOME | End: 2021-10-01
Attending: RADIOLOGY
Payer: COMMERCIAL

## 2021-10-01 PROCEDURE — 77386 HC NTSTY MODUL RAD TX DLVR CPLX: CPT | Performed by: RADIOLOGY

## 2021-10-01 PROCEDURE — 77014 PR CT GUIDANCE PLACEMENT RAD THERAPY FIELDS: CPT | Performed by: RADIOLOGY

## 2021-10-04 ENCOUNTER — APPOINTMENT (OUTPATIENT)
Dept: RADIATION ONCOLOGY | Age: 61
End: 2021-10-04
Attending: RADIOLOGY
Payer: COMMERCIAL

## 2021-10-05 ENCOUNTER — APPOINTMENT (OUTPATIENT)
Dept: RADIATION ONCOLOGY | Age: 61
End: 2021-10-05
Attending: RADIOLOGY
Payer: COMMERCIAL

## 2021-10-06 ENCOUNTER — HOSPITAL ENCOUNTER (OUTPATIENT)
Dept: RADIATION ONCOLOGY | Age: 61
Discharge: HOME | End: 2021-10-06
Attending: RADIOLOGY
Payer: COMMERCIAL

## 2021-10-06 PROCEDURE — 77014 PR CT GUIDANCE PLACEMENT RAD THERAPY FIELDS: CPT | Performed by: RADIOLOGY

## 2021-10-06 PROCEDURE — 77386 HC NTSTY MODUL RAD TX DLVR CPLX: CPT | Performed by: RADIOLOGY

## 2021-10-07 ENCOUNTER — HOSPITAL ENCOUNTER (OUTPATIENT)
Dept: RADIATION ONCOLOGY | Age: 61
Discharge: HOME | End: 2021-10-07
Attending: RADIOLOGY
Payer: COMMERCIAL

## 2021-10-07 VITALS
HEART RATE: 107 BPM | TEMPERATURE: 98.4 F | BODY MASS INDEX: 32.44 KG/M2 | DIASTOLIC BLOOD PRESSURE: 89 MMHG | RESPIRATION RATE: 16 BRPM | OXYGEN SATURATION: 98 % | SYSTOLIC BLOOD PRESSURE: 110 MMHG | WEIGHT: 232.6 LBS

## 2021-10-07 PROCEDURE — 77014 PR CT GUIDANCE PLACEMENT RAD THERAPY FIELDS: CPT | Performed by: RADIOLOGY

## 2021-10-07 PROCEDURE — 77386 HC NTSTY MODUL RAD TX DLVR CPLX: CPT | Performed by: RADIOLOGY

## 2021-10-07 NOTE — PROGRESS NOTES
Beltran Jean-Baptiste  10/7/2021  Wt Readings from Last 3 Encounters:   10/07/21 232 lb 9.6 oz (105.5 kg)   09/28/21 233 lb 6 oz (105.9 kg)   09/23/21 242 lb 6.4 oz (110 kg)     Body mass index is 32.44 kg/m². Treatment Area: CTV Lung mass & LN's    Patient was seen today for weekly visit. Comfort Alteration  KPS:90%  Fatigue: Mild    Ventilation Alterations  Cough: Yes  Hemoptysis: No  Mucus Color: grey  Dyspnea: Yes with exertion   O2 Sat: 98%    Nutritional Alteration  Anorexia: No  Nausea: No   Vomiting: No     Skin Alteration   Sensation:na    Radiation Dermatitis:  na    Mucous Membrane Alteration  Voice Changes/ Stridor/Larynx: no  Pharynx & Esophagus: -yes - carafate     Elimination Alterations  Constipation: no  Diarrhea:  no      Emotional  Coping: effective      Injury, potential bleeding or infection: na    Other:na    Lab Results   Component Value Date    WBC 8.6 07/20/2021     07/20/2021         /89   Pulse 107   Temp 98.4 °F (36.9 °C) (Infrared)   Resp 16   Wt 232 lb 9.6 oz (105.5 kg)   SpO2 98%   BMI 32.44 kg/m²   BP within normal range? yes         Assessment/Plan: Pt has completed 24/30 fractions. Pt states he is feeling better & eating better. He is following with dietitian. Weight continues to trend down.      Abel Corona RN

## 2021-10-07 NOTE — PROGRESS NOTES
DEPARTMENT OF RADIATION ONCOLOGY   ON TREATMENT VISIT       10/7/2021      NAME:  Brandon Gallo    YOB: 1960    DIAGNOSIS:    Carcinoma of right lung   Stage  III    SUBJECTIVE:       Brandon Gallo has now received   4800 cGy in 24  fractions directed to the Right lung /medistinum and right hilum. Planned total dose 6000 cGy      Past medical, surgical, social and family histories reviewed and updated as indicated. PAIN: Dysphagia improved with increasing the dosage of Carafate. ALLERGIES:  Food         Current Outpatient Medications   Medication Sig Dispense Refill    Magic Mouthwash (MIRACLE MOUTHWASH) Swish and swallow 10 mLs as needed for Irritation Every 3 to 4 hours as needed for mouth sore.  metFORMIN (GLUCOPHAGE) 500 MG tablet Take 500 mg by mouth 2 times daily (with meals)      sucralfate (CARAFATE) 1 GM tablet Take 1 tablet by mouth 4 times daily Dissolve tablet n small amount of H20 & drink. 120 tablet 3    fluticasone-salmeterol (ADVAIR) 500-50 MCG/DOSE diskus inhaler inhale 1 puff by mouth twice a day (RINSE MOUTH AFTER USE)      albuterol sulfate  (90 Base) MCG/ACT inhaler inhale 2 puffs by mouth every 6 hours if needed      ondansetron (ZOFRAN) 8 MG tablet take 1 tablet by mouth every 8 hours if needed for nausea and vomiting      dexamethasone (DECADRON) 4 MG tablet take 5 tablets (20 MILLIGRAMS) by mouth as directed 12 hours AND . ..  (REFER TO PRESCRIPTION NOTES).  losartan (COZAAR) 100 MG tablet Take 100 mg by mouth daily      predniSONE (DELTASONE) 10 MG tablet Take 10 mg by mouth every other day      ibuprofen (ADVIL;MOTRIN) 200 MG tablet Take 400 mg by mouth every 6 hours as needed for Pain      Naproxen Sodium (ALEVE PO) Take by mouth as needed      acetaminophen (TYLENOL) 500 MG tablet Take 500 mg by mouth every 6 hours as needed for Pain       No current facility-administered medications for this encounter.      Subjective: Productive cough with grayish sputum which is not unexpected at this stage of the Heart program    OBJECTIVE:  Alert and fully ambulatory. Pleasant and conversant. Physical Examination    Constitutional:   A well developed, well nourished 62 y.o. male who is alert, oriented, cooperative and in no apparent distress. HEENT: No palpable lymph nodes in the neck. Lungs are clinically clear to auscultation. Cardiovascular: Regular without murmur. Skin:  Warm and dry. No obvious rashes. Vitals:    10/07/21 0833   BP: 110/89   Pulse: 107   Resp: 16   Temp: 98.4 °F (36.9 °C)   TempSrc: Infrared   SpO2: 98%   Weight: 232 lb 9.6 oz (105.5 kg)       Wt Readings from Last 3 Encounters:   10/07/21 232 lb 9.6 oz (105.5 kg)   09/28/21 233 lb 6 oz (105.9 kg)   09/23/21 242 lb 6.4 oz (110 kg)       ASSESSMENT/PLAN:     Patient is tolerating treatments well with expected toxicities. Current and planned dose reviewed. Goals of treatment and potential side effects were reviewed with the patient. Treatment imaging has been personally reviewed for accuracy and precision. Questions answered to apparent satisfaction. Treatments will continue as planned. Thank you for the opportunity to participate in multidisciplinary management of this remarkable and pleasant patient.       MD Chris Huerta Glendy    Department of Radiation Oncology    [address]: [phone] (OME: [phone])  [address] [place] [address]: [phone] (PLP: [phone])  [facility]:  [phone] (WSW:  [phone])

## 2021-10-08 ENCOUNTER — HOSPITAL ENCOUNTER (OUTPATIENT)
Dept: RADIATION ONCOLOGY | Age: 61
Discharge: HOME | End: 2021-10-08
Attending: RADIOLOGY
Payer: COMMERCIAL

## 2021-10-08 PROCEDURE — 77386 HC NTSTY MODUL RAD TX DLVR CPLX: CPT | Performed by: RADIOLOGY

## 2021-10-08 PROCEDURE — 77014 PR CT GUIDANCE PLACEMENT RAD THERAPY FIELDS: CPT | Performed by: RADIOLOGY

## 2021-10-08 PROCEDURE — 77427 RADIATION TX MANAGEMENT X5: CPT | Performed by: RADIOLOGY

## 2021-10-08 PROCEDURE — 77336 RADIATION PHYSICS CONSULT: CPT | Performed by: RADIOLOGY

## 2021-10-11 ENCOUNTER — HOSPITAL ENCOUNTER (OUTPATIENT)
Dept: RADIATION ONCOLOGY | Age: 61
Discharge: HOME | End: 2021-10-11
Attending: RADIOLOGY
Payer: COMMERCIAL

## 2021-10-11 PROCEDURE — 77014 PR CT GUIDANCE PLACEMENT RAD THERAPY FIELDS: CPT | Performed by: RADIOLOGY

## 2021-10-11 PROCEDURE — 77386 HC NTSTY MODUL RAD TX DLVR CPLX: CPT | Performed by: RADIOLOGY

## 2021-10-12 ENCOUNTER — HOSPITAL ENCOUNTER (OUTPATIENT)
Dept: RADIATION ONCOLOGY | Age: 61
Discharge: HOME | End: 2021-10-12
Attending: RADIOLOGY
Payer: COMMERCIAL

## 2021-10-12 ENCOUNTER — TELEPHONE (OUTPATIENT)
Dept: CASE MANAGEMENT | Age: 61
End: 2021-10-12

## 2021-10-12 VITALS
SYSTOLIC BLOOD PRESSURE: 124 MMHG | HEART RATE: 114 BPM | BODY MASS INDEX: 31.88 KG/M2 | DIASTOLIC BLOOD PRESSURE: 76 MMHG | WEIGHT: 228.6 LBS

## 2021-10-12 DIAGNOSIS — C34.90 MALIGNANT NEOPLASM OF LUNG, UNSPECIFIED LATERALITY, UNSPECIFIED PART OF LUNG (HCC): Primary | ICD-10-CM

## 2021-10-12 PROCEDURE — 77014 PR CT GUIDANCE PLACEMENT RAD THERAPY FIELDS: CPT | Performed by: RADIOLOGY

## 2021-10-12 PROCEDURE — 77386 HC NTSTY MODUL RAD TX DLVR CPLX: CPT | Performed by: RADIOLOGY

## 2021-10-12 PROCEDURE — 99999 PR OFFICE/OUTPT VISIT,PROCEDURE ONLY: CPT | Performed by: RADIOLOGY

## 2021-10-12 NOTE — PATIENT INSTRUCTIONS
Continue daily fractionated radiation therapy as scheduled. Please see weekly OTV note and intial consultation letter in [facility] for clinical details. Karmen Trevino. Jerome Gonzalez MD [facility]:  [phone]   FAX: [phone]  [facility]:  [phone]   FAX:    [phone]  [facility]:  [phone]   FAX:  [phone]  Email: [email or web address]

## 2021-10-12 NOTE — PROGRESS NOTES
Radiation Oncology          - no PEG  - cont NUT support, pt has 4 Tx remaining        Steven Bee MD [facility] [address] [facility]  Cell: [phone]    [facility]:  [phone]   FAX: [phone]  [facility]:  [facility]:    [phone]  [address] [place] [address] Road:  [address] [place] [address] Road:  [phone]

## 2021-10-12 NOTE — PROGRESS NOTES
Ledell Oven  10/12/2021  Wt Readings from Last 3 Encounters:   10/12/21 228 lb 9.6 oz (103.7 kg)   10/07/21 232 lb 9.6 oz (105.5 kg)   09/28/21 233 lb 6 oz (105.9 kg)     Body mass index is 31.88 kg/m². Treatment Area:CTV Lung    Patient was seen today for weekly visit. Comfort Alteration  KPS: 80%  Fatigue: Mild    Ventilation Alterations  Cough: No  Hemoptysis: No  Mucus Color: na  Dyspnea: No  O2 Sat: 97%    Nutritional Alteration  Anorexia: Yes  Nausea: Yes   Vomiting: Yes     Skin Alteration   Sensation:na    Radiation Dermatitis:  na    Mucous Membrane Alteration  Voice Changes/ Stridor/Larynx: no  Pharynx & Esophagus: yes    Elimination Alterations  Constipation: no  Diarrhea:  yes      Emotional  Coping: effective      Injury, potential bleeding or infection: na    Other:na    Lab Results   Component Value Date    WBC 8.6 07/20/2021     07/20/2021         Wt 228 lb 9.6 oz (103.7 kg)   BMI 31.88 kg/m²   BP within normal range? yes    Assessment/Plan: pt has completed 27/30 fractions. Pt is tolerating fair. Weight is continuing to trend down. Dietitian is present.     Aleida García RN

## 2021-10-12 NOTE — PROGRESS NOTES
DEPARTMENT OF RADIATION ONCOLOGY ON TREATMENT VISIT         10/12/2021      NAME:  Cheko Daniel    YOB: 1960    Diagnosis: lung CA    SUBJECTIVE:   Cheko Daniel has now received fractionated external beam radiation therapy - ongoing. Past medical, surgical, social and family histories reviewed and updated as indicated. Pain: controlled    ALLERGIES:  Food         Current Outpatient Medications   Medication Sig Dispense Refill    Magic Mouthwash (MIRACLE MOUTHWASH) Swish and swallow 10 mLs as needed for Irritation Every 3 to 4 hours as needed for mouth sore.  metFORMIN (GLUCOPHAGE) 500 MG tablet Take 500 mg by mouth 2 times daily (with meals)      sucralfate (CARAFATE) 1 GM tablet Take 1 tablet by mouth 4 times daily Dissolve tablet n small amount of H20 & drink. 120 tablet 3    fluticasone-salmeterol (ADVAIR) 500-50 MCG/DOSE diskus inhaler inhale 1 puff by mouth twice a day (RINSE MOUTH AFTER USE)      albuterol sulfate  (90 Base) MCG/ACT inhaler inhale 2 puffs by mouth every 6 hours if needed      ondansetron (ZOFRAN) 8 MG tablet take 1 tablet by mouth every 8 hours if needed for nausea and vomiting      dexamethasone (DECADRON) 4 MG tablet take 5 tablets (20 MILLIGRAMS) by mouth as directed 12 hours AND . ..  (REFER TO PRESCRIPTION NOTES).  losartan (COZAAR) 100 MG tablet Take 100 mg by mouth daily      predniSONE (DELTASONE) 10 MG tablet Take 10 mg by mouth every other day      ibuprofen (ADVIL;MOTRIN) 200 MG tablet Take 400 mg by mouth every 6 hours as needed for Pain      Naproxen Sodium (ALEVE PO) Take by mouth as needed      acetaminophen (TYLENOL) 500 MG tablet Take 500 mg by mouth every 6 hours as needed for Pain       No current facility-administered medications for this encounter. OBJECTIVE:  Alert and fully ambulatory. Pleasant and conversant.       Physical Examination: General appearance - alert, well appearing, and in no distress. Wt Readings from Last 3 Encounters:   10/12/21 228 lb 9.6 oz (103.7 kg)   10/07/21 232 lb 9.6 oz (105.5 kg)   09/28/21 233 lb 6 oz (105.9 kg)         ASSESSMENT/PLAN:     Patient is tolerating treatments well with expected toxicities. RBA were reviewed prior to first fraction and PRN. Current and planned dose reviewed. Goals of treatment and potential side effects were reviewed with the patient PRN. Treatment imaging has been personally reviewed for accuracy and precision. Questions answered to apparent satisfaction. Treatments will continue as planned. Shu Gipson.  Satinder Nicholson MD MS ISELAR  Radiation Oncologist        [facility] ([address] [place] [address]): [phone] /// FAX: [phone]  [facility]): [phone] /// FAX: [phone]  [address] [place] [address]): [phone] /// FAX: [phone]

## 2021-10-13 ENCOUNTER — HOSPITAL ENCOUNTER (OUTPATIENT)
Dept: RADIATION ONCOLOGY | Age: 61
Discharge: HOME | End: 2021-10-13
Attending: RADIOLOGY
Payer: COMMERCIAL

## 2021-10-13 PROCEDURE — 77386 HC NTSTY MODUL RAD TX DLVR CPLX: CPT | Performed by: RADIOLOGY

## 2021-10-13 PROCEDURE — 77014 PR CT GUIDANCE PLACEMENT RAD THERAPY FIELDS: CPT | Performed by: RADIOLOGY

## 2021-10-13 NOTE — PROGRESS NOTES
Hiwot Winters  10/13/2021  Wt Readings from Last 10 Encounters:   10/12/21 228 lb 9.6 oz (103.7 kg)   10/07/21 232 lb 9.6 oz (105.5 kg)   09/28/21 233 lb 6 oz (105.9 kg)   09/23/21 242 lb 6.4 oz (110 kg)   09/07/21 253 lb (114.8 kg)   08/24/21 254 lb (115.2 kg)   08/12/21 255 lb 9.6 oz (115.9 kg)   07/20/21 250 lb (113.4 kg)   07/19/21 250 lb (113.4 kg)     Ht Readings from Last 1 Encounters:   08/24/21 5' 11\" (1.803 m)     Met with pt today for follow up, pt has completed 27/30 XRT. He reported intake of 1 Ensure Enlive/Complete in addition to bites/sips of mostly soft foods and liquids daily. He did note that yesterday he was feeling better than usual and was able to eat a chicken leg. He estimated that his fluid intake is roughly 33 oz daily at this time. He complained of nausea and diarrhea. Discussed importance of maintaining hydration to prevent worsening nausea, fatigue, and other side effects. Encouraged pt to increase ONS intake. He noted that he has been enjoying chicken noodle and other soups recently. Discussed that soup is a good source of fluid and will contain some protein/calories but volume for volume the ONS will provide substantially more nutritional value. Encouraged pt to increase ONS intake to a minimum of 3 daily and increase if other intake declines, with up to 7-8 daily if other intake if minimal. Discussed importance of maintaining wt/muscle mass for the recovery process. Provided pt with samples of Boost VHC and provided encouragement. Pt was receptive of information provided, all questions were answered, and will continue to follow.       Weight change: 11% wt loss in 2 months  Appetite: poor  N/V/D/C: nausea and diarrhea   Calculated Needs if applicable: Using ABW= 513.4 lb (84.6 kg)  9848-1944 kcal (30-32 kcal/kg), 110-130 gm protein (1.3-1.5 gm/kg), 2750 ml FW (32 ml/kg)    Recommendations:    ASPEN GUIDELINES FOR CLINICAL CHARACTERISTICS OF MALNUTRITION IN CHRONIC ILLNESS Moderate Malnutrition  Severe Malnutrition    Energy intake  <75% energy intake compared to estimated needs for >1month <75% energy intake compared to estimated needs for >1month   Weight changes  5% x 1 month  7.5% x 3 months   10% x 6 months   20% x 1 year  >5% x 1 month  >7.5% x 3 months   >10% x 6 months   >20% x 1 year    Physical findings  Mild   Decrease subcutaneous fat    Decrease muscle mass     Increase fluid/edema   Severe  Decrease subcutaneous fat    Decrease muscle mass     Increase fluid/edema    Pt meets criteria for severe malnutrition AEB 11% wt loss in 2 months and pt report of <75% intake for >1 month.      Patricia Reynoso, BIJU, LD

## 2021-10-14 ENCOUNTER — APPOINTMENT (OUTPATIENT)
Dept: RADIATION ONCOLOGY | Age: 61
End: 2021-10-14
Attending: RADIOLOGY
Payer: COMMERCIAL

## 2021-10-14 ENCOUNTER — HOSPITAL ENCOUNTER (OUTPATIENT)
Dept: RADIATION ONCOLOGY | Age: 61
Discharge: HOME | End: 2021-10-14
Attending: RADIOLOGY
Payer: COMMERCIAL

## 2021-10-14 PROCEDURE — 77386 HC NTSTY MODUL RAD TX DLVR CPLX: CPT | Performed by: RADIOLOGY

## 2021-10-14 PROCEDURE — 77014 PR CT GUIDANCE PLACEMENT RAD THERAPY FIELDS: CPT | Performed by: RADIOLOGY

## 2021-10-15 ENCOUNTER — HOSPITAL ENCOUNTER (OUTPATIENT)
Dept: RADIATION ONCOLOGY | Age: 61
Discharge: HOME | End: 2021-10-15
Attending: RADIOLOGY
Payer: COMMERCIAL

## 2021-10-15 ENCOUNTER — HOSPITAL ENCOUNTER (OUTPATIENT)
Dept: MRI IMAGING | Age: 61
Discharge: HOME | End: 2021-10-17
Payer: COMMERCIAL

## 2021-10-15 ENCOUNTER — TELEPHONE (OUTPATIENT)
Dept: RADIATION ONCOLOGY | Age: 61
End: 2021-10-15

## 2021-10-15 DIAGNOSIS — C79.31 METASTASIS TO BRAIN (HCC): Primary | ICD-10-CM

## 2021-10-15 DIAGNOSIS — C79.31 METASTASIS TO BRAIN (HCC): ICD-10-CM

## 2021-10-15 PROCEDURE — A9577 INJ MULTIHANCE: HCPCS | Performed by: RADIOLOGY

## 2021-10-15 PROCEDURE — 77014 PR CT GUIDANCE PLACEMENT RAD THERAPY FIELDS: CPT | Performed by: RADIOLOGY

## 2021-10-15 PROCEDURE — 6360000004 HC RX CONTRAST MEDICATION: Performed by: RADIOLOGY

## 2021-10-15 PROCEDURE — 77427 RADIATION TX MANAGEMENT X5: CPT | Performed by: RADIOLOGY

## 2021-10-15 PROCEDURE — 77386 HC NTSTY MODUL RAD TX DLVR CPLX: CPT | Performed by: RADIOLOGY

## 2021-10-15 PROCEDURE — 70552 MRI BRAIN STEM W/DYE: CPT

## 2021-10-15 PROCEDURE — 77336 RADIATION PHYSICS CONSULT: CPT | Performed by: RADIOLOGY

## 2021-10-15 RX ADMIN — GADOBENATE DIMEGLUMINE 40 ML: 529 INJECTION, SOLUTION INTRAVENOUS at 18:49

## 2021-10-15 NOTE — TELEPHONE ENCOUNTER
Rad onc nurse called central scheduling to make an appt for a stat MRI. Appt was made for today 10/15/21 for 7:00p.m. and arrival time of 6:30p.m. Instructions were and and they verbalized understand.

## 2021-10-18 ENCOUNTER — IMMUNIZATION (OUTPATIENT)
Dept: PRIMARY CARE CLINIC | Age: 61
End: 2021-10-18
Payer: COMMERCIAL

## 2021-10-18 PROCEDURE — 91301 COVID-19, MODERNA VACCINE 100MCG/0.5ML DOSE: CPT | Performed by: NURSE PRACTITIONER

## 2021-10-18 PROCEDURE — 0012A COVID-19, MODERNA VACCINE 100MCG/0.5ML DOSE: CPT | Performed by: NURSE PRACTITIONER

## 2021-10-26 ENCOUNTER — HOSPITAL ENCOUNTER (OUTPATIENT)
Dept: RADIATION ONCOLOGY | Age: 61
Discharge: HOME | End: 2021-10-26
Payer: COMMERCIAL

## 2021-10-26 VITALS
HEART RATE: 82 BPM | OXYGEN SATURATION: 98 % | BODY MASS INDEX: 31.86 KG/M2 | DIASTOLIC BLOOD PRESSURE: 72 MMHG | RESPIRATION RATE: 18 BRPM | SYSTOLIC BLOOD PRESSURE: 130 MMHG | TEMPERATURE: 97.1 F | WEIGHT: 228.4 LBS

## 2021-10-26 DIAGNOSIS — C79.31 METASTASIS TO BRAIN (HCC): Primary | ICD-10-CM

## 2021-10-26 PROCEDURE — 99205 OFFICE O/P NEW HI 60 MIN: CPT

## 2021-10-26 PROCEDURE — 99215 OFFICE O/P EST HI 40 MIN: CPT | Performed by: SPECIALIST

## 2021-10-26 ASSESSMENT — PAIN DESCRIPTION - LOCATION: LOCATION: CHEST

## 2021-10-26 ASSESSMENT — PAIN SCALES - GENERAL: PAINLEVEL_OUTOF10: 3

## 2021-10-26 ASSESSMENT — PAIN DESCRIPTION - FREQUENCY: FREQUENCY: CONTINUOUS

## 2021-10-26 NOTE — PROGRESS NOTES
Radiation Oncology Consult Note         10/26/2021    Matthew Alicea  62 y.o.   1960    REFERRING PROVIDER: Philip Martin    PCP:  Dali Snell DO    CHIEF COMPLAINT: Brain tumors    DIAGNOSIS: Brain Metastases, NSCLC RUL    STAGING: Cancer Staging  NSCLC of right lung ([facility] [address]  Staging form: Lung, AJCC 8th Edition  - Clinical stage from 8/12/2021: Stage IIIB (cT3, cN2, cM0) - Unsigned      HISTORY OF PRESENT ILLNESS: Mr. Matthew Alicea  is a 62y.o. year old right-handed male  While being treated for a IIIB NSCLC of the RUL, a routine MRI of the brain on September 9th 2021 found 2 asymptomatic enhancing lesions in the brain consistent with metastases. They are located in the right frontal and left occipital lobes measuring 1.4x1.2cm and 1.3x1.1cm, respectively. Repeat MRI at the conclusion of chemoradiation noted slight reduction in size of the right frontal (now 0.9x0.5cm) and the left occipital (now 1.1x0.9cm). We have been asked to see the patient in consultation for consideration of stereotactic radiosurgery to these metastases . PAST MEDICAL HISTORY:      Diagnosis Date    Arthritis     R hip     Cancer ([facility] [address]     lung     Hypertension        PAST SURGICAL HISTORY:      Procedure Laterality Date    CATHETER INSERTION N/A 8/24/2021    MEDIPORT INSERTION performed by Nila Gamboa MD at [address] [place] [address],[facility] IR PERCUT BX LUNG/MEDIASTINUM  7/20/2021    IR PERCUT BX LUNG/MEDIASTINUM 7/20/2021 [facility] CT       Allergies   Allergen Reactions    Food Anaphylaxis and Swelling     Tree nuts, banana's, cabbage, cucumbers       MEDICATIONS:  Medications reviewed and reconciled. Current Outpatient Medications   Medication Sig Dispense Refill    Magic Mouthwash (MIRACLE MOUTHWASH) Swish and swallow 10 mLs as needed for Irritation Every 3 to 4 hours as needed for mouth sore.       metFORMIN (GLUCOPHAGE) 500 MG tablet Take 500 mg by mouth 2 times daily (with meals)      sucralfate (CARAFATE) 1 GM tablet Take 1 tablet by mouth 4 times daily Dissolve tablet n small amount of H20 & drink. 120 tablet 3    fluticasone-salmeterol (ADVAIR) 500-50 MCG/DOSE diskus inhaler inhale 1 puff by mouth twice a day (RINSE MOUTH AFTER USE)      albuterol sulfate  (90 Base) MCG/ACT inhaler inhale 2 puffs by mouth every 6 hours if needed      ondansetron (ZOFRAN) 8 MG tablet take 1 tablet by mouth every 8 hours if needed for nausea and vomiting      dexamethasone (DECADRON) 4 MG tablet take 5 tablets (20 MILLIGRAMS) by mouth as directed 12 hours AND . ..  (REFER TO PRESCRIPTION NOTES).  losartan (COZAAR) 100 MG tablet Take 100 mg by mouth daily      predniSONE (DELTASONE) 10 MG tablet Take 10 mg by mouth every other day      ibuprofen (ADVIL;MOTRIN) 200 MG tablet Take 400 mg by mouth every 6 hours as needed for Pain      Naproxen Sodium (ALEVE PO) Take by mouth as needed      acetaminophen (TYLENOL) 500 MG tablet Take 500 mg by mouth every 6 hours as needed for Pain       No current facility-administered medications for this encounter. FAMILY HISTORY:  Family History   Problem Relation Age of Onset    Cancer Brother         lung        SOCIAL HISTORY:       reports that he quit smoking about 9 months ago. He has never used smokeless tobacco..   reports current alcohol use of about 1.0 standard drinks of alcohol per week. .   reports current drug use. Drug: Marijuana. REVIEW OF SYSTEMS:  Obtained from the patient, chart review and nursing assessment.   General ROS: positive for  - fatigue  Psychological ROS: positive for - anxiety  Ophthalmic ROS: negative  ENT ROS: negative  Allergy and Immunology ROS: negative  Hematological and Lymphatic ROS: negative  Endocrine ROS: negative  Breast ROS: negative  Respiratory ROS: no cough, shortness of breath, or wheezing  Cardiovascular ROS: no chest pain or dyspnea on exertion  Gastrointestinal ROS: no abdominal pain, change in bowel habits, or black or bloody stools  Genito-Urinary ROS: no dysuria, trouble voiding, or hematuria  Musculoskeletal ROS: negative  Neurological ROS: negative  Dermatological ROS: negative    PHYSICAL EXAMINATION:      There were no vitals filed for this visit. Wt Readings from Last 3 Encounters:   10/12/21 228 lb 9.6 oz (103.7 kg)   10/07/21 232 lb 9.6 oz (105.5 kg)   09/28/21 233 lb 6 oz (105.9 kg)       KARNOSKY PERFORMANCE STATUS:      Constitutional: A well developed, well nourished 62 y.o. male who is alert, oriented, cooperative and in no apparent distress. EENT: Round reactive to light, 2 mm. Ocular movements are intact. Neck: Supple without lymphadenopathy  Respiratory: Breath sounds bilaterally were clear to auscultation. No wheezes, rhonchi or rales. Breasts: Deferred    Cardiovascular: Regular without murmur. Abdomen: Obese, rounded and soft without organomegaly. No rebound, guarding or  rigidity. Lymphatic: No lymphadenopathy. Musculoskeletal: Ambulates without assistance. No gross muscle weakness. Muscle size, tone and strength are normal. No involuntary movements. Extremities:  No lower extremity edema. Coordination appears adequate. Sensory function appears intact. Skin:  Warm and dry. Examination of color, turgor and pigmentation was relatively normal. No bruises or skin rashes. Neurological/Psychiatric: General behavior, level of consciousness, thought content is normal. The patient's emotional status is normal.  Cranial nerves II-XII are grossly intact.         RADIATION SAFETY AND ONCOLOGIC TREATMENT SUPPORT:    - Previous radiation history:  No  - History of autoimmune or connective tissue disease:  No  - Nutritional support/ PEG:  Not applicable  - Dental evaluation:  Not applicable  -  requested:  Not asked for.  - Oncology Nurse navigator requested:  - Transportation for daily treatment:  Self    TUMOR MARKERS: No results found for: PSA, CEA, , SY3528,     IMAGING REVIEWED:  9/9/2021 MRI Brain  Impression   1.  Two metastases in the right frontal and left occipital lobes, measuring   1.4 x 1.2 cm and 1.3 x 1.1 cm, respectively. 2. No significant mass effect, hemorrhage or midline shift. 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 Prognosis has been associated with histology, patient performance status, histology, extent of intracranial disease amongst other factors. Surgical resection (when feasible), whole brain radiation therapy (WBRT) and stereotactic radiosurgery [facility]) or combinations thereof are the most common treatment techniques used in patients with brain metastasis. WBRT radiation therapy for decades was the only effective radiation technique for brain mets however long-term control was suboptimal with up to 55% of patient succumbing to progressive brain disease ([address]). Data from prospective clinical trials (RTOG 9005 & 9508) have demonstrated the value of SRS in the management of brain metastases either as a boost post-WBRT or denovo (Lancet 363:8577-5991,2004). Numerous clinical trials have demonstrated neurocognitive sequelae of WBRT. The JROSG 99.1 randomized patients to WBRT vs. SRS. Although there was no difference in median OS (7.5 v. 8 months). As a result, programs have evolved to the use of WBRT to carefully management with SRS and serial follow-up. This paradigm has resulted in Giovanna's Choosing Wisely recommendation against the routine addition of WBRT after SRS ([employer]es. org/societies/american-society-for-radiation-oncology/). In this case the incidental asymptomatic finding of these lesions and the size and location make them highly amenable to stereotactic radiosurgery. Consequently have made arrangements for multidisciplinary collaboration discussion with Dr. Nuzhat Dennis and review in our multidisciplinary neuro-oncology tumor board. Brain almaraz al. Int J Radiat Oncol Biol Phys 2017; 100() 152-260. Padmini. Khadijah Heart Funct Neurosurg 2011; 80: [address] 2017; 18: 1040-48. Josy et al. Int J Radiat Oncol Biol Phys 2013; 51:368-67. NCCN guidelines, version 2020 is used to help review treatment recommendations. Procedures and process involved with CT simulation and daily radiation treatments were explained. Toxicities, both expected and less common, were reviewed in detail. Questions were answered to their apparent satisfaction. I spent 65 minutes with this patient 55 minutes of which was spent in reviewing the patient's case with him and coordination of care. Thank you for the opportunity to participate in multidisciplinary management of this remarkable and pleasant patient.       Mary Richey MD, [address], [address], [address] [place] [address]    Department of Radiation Oncology  [facility]) [facility]: [phone] (Q: [phone])  [address] [place] [address]: [phone] (NVN: [phone])  [facility]:  [phone] (IRI:  [phone])

## 2021-10-26 NOTE — PROGRESS NOTES
Tavolake Knutson  1960 62 y.o. Referring Physician: Dr. Maria Teresa Alcantara    PCP: Isabelle Sifuentes, DO     Vitals:    10/26/21 1323   BP: 130/72   Pulse: 82   Resp: 18   Temp: 97.1 °F (36.2 °C)   SpO2: 98%        Wt Readings from Last 3 Encounters:   10/26/21 228 lb 6.4 oz (103.6 kg)   10/12/21 228 lb 9.6 oz (103.7 kg)   10/07/21 232 lb 9.6 oz (105.5 kg)        Body mass index is 31.86 kg/m². Chief Complaint: No chief complaint on file. Cancer Staging  NSCLC of right lung [facility])  Staging form: Lung, AJCC 8th Edition  - Clinical stage from 8/12/2021: Stage IIIB (cT3, cN2, cM0) - Unsigned      Prior Radiation Therapy? YES: Site Treated: lung          Facility: [facility]           Date: current    Concurrent Chemo/radiation? YES: Site Treated: [facility]          Facility: lung          Date: currently    Prior Chemotherapy? YES: Site Treated: [facility]          Facility: lung          Date: current    Prior Hormonal Therapy? NO    Head and Neck Cancer? No, patient does NOT have HN cancer. Current Outpatient Medications   Medication Sig Dispense Refill    Magic Mouthwash (MIRACLE MOUTHWASH) Swish and swallow 10 mLs as needed for Irritation Every 3 to 4 hours as needed for mouth sore.  metFORMIN (GLUCOPHAGE) 500 MG tablet Take 500 mg by mouth 2 times daily (with meals)      sucralfate (CARAFATE) 1 GM tablet Take 1 tablet by mouth 4 times daily Dissolve tablet n small amount of H20 & drink. 120 tablet 3    fluticasone-salmeterol (ADVAIR) 500-50 MCG/DOSE diskus inhaler inhale 1 puff by mouth twice a day (RINSE MOUTH AFTER USE)      albuterol sulfate  (90 Base) MCG/ACT inhaler inhale 2 puffs by mouth every 6 hours if needed      ondansetron (ZOFRAN) 8 MG tablet take 1 tablet by mouth every 8 hours if needed for nausea and vomiting      dexamethasone (DECADRON) 4 MG tablet take 5 tablets (20 MILLIGRAMS) by mouth as directed 12 hours AND . ..  (REFER TO PRESCRIPTION NOTES).  losartan (COZAAR) 100 MG tablet Take 100 mg by mouth daily      predniSONE (DELTASONE) 10 MG tablet Take 10 mg by mouth every other day      ibuprofen (ADVIL;MOTRIN) 200 MG tablet Take 400 mg by mouth every 6 hours as needed for Pain      Naproxen Sodium (ALEVE PO) Take by mouth as needed      acetaminophen (TYLENOL) 500 MG tablet Take 500 mg by mouth every 6 hours as needed for Pain       No current facility-administered medications for this encounter.        Past Medical History:   Diagnosis Date    Arthritis     R hip     Cancer ([address]     lung     Hypertension        Past Surgical History:   Procedure Laterality Date    CATHETER INSERTION N/A 2021    MEDIPORT INSERTION performed by Luz Grady MD at [facility] IR [address] [facility] BX LUNG/MEDIASTINUM  2021    IR PERCUT BX LUNG/MEDIASTINUM 2021 [facility] CT       Family History   Problem Relation Age of Onset    Cancer Brother         lung        Social History     Socioeconomic History    Marital status: Life Partner     Spouse name: Lauri Shaver     Number of children: 3    Years of education: some college     Highest education level: Not on file   Occupational History    Not on file   Tobacco Use    Smoking status: Former Smoker     Quit date:      Years since quittin.8    Smokeless tobacco: Never Used    Tobacco comment: Quit May 2021    Substance and Sexual Activity    Alcohol use: Not Currently     Alcohol/week: 1.0 standard drinks     Types: 1 Cans of beer per week     Comment: weekend    Drug use: Yes     Types: Marijuana     Comment: smoked 21; mostly edibles     Sexual activity: Not on file   Other Topics Concern    Not on file   Social History Narrative    Not on file     Social Determinants of Health     Financial Resource Strain:     Difficulty of Paying Living Expenses:    Food Insecurity:     Worried About Running Out of Food in the Last Year:     [address] in the Last Year: Transportation Needs:     Lack of Transportation (Medical):  Lack of Transportation (Non-Medical):    Physical Activity:     Days of Exercise per Week:     Minutes of Exercise per Session:    Stress:     Feeling of Stress :    Social Connections:     Frequency of Communication with Friends and Family:     Frequency of Social Gatherings with Friends and Family:     Attends [employer] Services:     Active Member of Clubs or Organizations:     Attends Club or Organization Meetings:     Marital Status:    Intimate Partner Violence:     Fear of Current or Ex-Partner:     Emotionally Abused:     Physically Abused:     Sexually Abused:            Occupation: [employer]  Retired:  NO        REVIEW OF SYSTEMS: <<For Level 5, 10 or more systems>> approximately >20 mins spent with patient about SRS to the brain utilizing handouts and slides. He has a history of RUL SCC. Patient was treated by Dr. Jose Tineo with radiation CTV lung mass +LN  9/1/2021-10/15/2021 30fx/6000cGY. Pt has been receiving chemotherapy by Dr. Huong Scott also and tolerating. He had an MRI 9/9/2021 that presented 2 lesions right frontal 1.4x1.2cm and left occipital lesion 1.3x1.1cm. upon completing his prior radaition he had a repeat MRI 10/15/2021 and the lesions decreased to 9x5mm right frontal //11x9mm left occipital. Plan is for [facility]2 sessions. Pt was given [facility] appointment for Tuesday 11/2/2021 at 1100. He has a referral to see Dr. Adenike Hernandez. All questions were answered from a nursing perspective and he expressed understanding of care. Pacemaker/Defibulator/ICD:  No    Mediport: Yes        FALLS RISK SCREENING ASSESSMENT    Instructions:  Assess the patient and enter the appropriate indicators that are present for fall risk identification. Total the numbers entered and assign a fall risk score from Table 2.  Reassess patient at a minimum every 12 weeks or with status change. Assessment   Date  10/26/2021     1.   Mental Ability: confusion/cognitively impaired No - 0       2. Elimination Issues: incontinence, frequency No - 0       3. Ambulatory: use of assistive devices (walker, cane, off-loading devices), attached to equipment (IV pole, oxygen) No - 0     4. Sensory Limitations: dizziness, vertigo, impaired vision No - 0       5. Age Less than 65 years - 0       6. Medication: diuretics, strong analgesics, hypnotics, sedatives, antihypertensive agents   Yes - 3   7. Falls:  recent history of falls within the last 3 months (not to include slipping or tripping)   No - 0   TOTAL 3    If score of 4 or greater was education given? Yes       TABLE 2   Risk Score Risk Level Plan of Care   0-3 Little or  No Risk 1. Provide assistance as indicated for ambulation activities  2. Reorient confused/cognitively impaired patient  3. Call-light/bell within patient's reach  4. Chair/bed in low position, stretcher/bed with siderails up except when performing patient care activities  5. Educate patient/family/caregiver on falls prevention  6.  Reassess in 12 weeks or with any noted change in patient condition which places them at a risk for a fall   4-6 Moderate Risk 1. Provide assistance as indicated for ambulation activities  2. Reorient confused/cognitively impaired patient  3. Call-light/bell within patient's reach  4. Chair/bed in low position, stretcher/bed with siderails up except when performing patient care activities  5. Educate patient/family/caregiver on falls prevention  6. Falls risk precaution (Yellow sticker Level II) placed on patient chart   7 or   Higher High Risk 1. Place patient in easily observable treatment room  2. Patient attended at all times by family member or staff  3. Provide assistance as indicated for ambulation activities  4. Reorient confused/cognitively impaired patient  5. Call-light/bell within patient's reach  6.   Chair/bed in low position, stretcher/bed with siderails up except when walking: No     · Do you need to take rest breaks when you are walking: No     · Any pain on a scale of 1-10 that limits your mobility: No 0/10    ARE ANY OF THE ABOVE ARE ANSWERED YES: No             .          PREHAB AUDIOLOGY REFERRAL    - Is patient planned to receive Cisplatin? No. This patient is not planned to start Cisplatin. - Is patient planned to receive radiation therapy that may be directed toward auditory canals or nerves? No. Patient is not planned to start radiation therapy to auditory canals or nerves. - Is patient complaining of new onset hearing loss? No. Patient is not complaining of new onset hearing loss. Patient education given on SRS to the brain. The patient expresses understanding and acceptance of instructions.  Valentino Whaley RN 10/26/2021 1:26 PM           Valentino Whaley RN

## 2021-10-28 ENCOUNTER — OFFICE VISIT (OUTPATIENT)
Dept: NEUROSURGERY | Age: 61
End: 2021-10-28
Payer: COMMERCIAL

## 2021-10-28 VITALS
HEART RATE: 91 BPM | RESPIRATION RATE: 18 BRPM | OXYGEN SATURATION: 98 % | SYSTOLIC BLOOD PRESSURE: 128 MMHG | DIASTOLIC BLOOD PRESSURE: 80 MMHG | HEIGHT: 71 IN | TEMPERATURE: 98.2 F | BODY MASS INDEX: 31.92 KG/M2 | WEIGHT: 228 LBS

## 2021-10-28 DIAGNOSIS — D49.6 BRAIN TUMOR (HCC): Primary | ICD-10-CM

## 2021-10-28 PROCEDURE — G8484 FLU IMMUNIZE NO ADMIN: HCPCS | Performed by: NEUROLOGICAL SURGERY

## 2021-10-28 PROCEDURE — 3017F COLORECTAL CA SCREEN DOC REV: CPT | Performed by: NEUROLOGICAL SURGERY

## 2021-10-28 PROCEDURE — 1036F TOBACCO NON-USER: CPT | Performed by: NEUROLOGICAL SURGERY

## 2021-10-28 PROCEDURE — G8417 CALC BMI ABV UP PARAM F/U: HCPCS | Performed by: NEUROLOGICAL SURGERY

## 2021-10-28 PROCEDURE — G8427 DOCREV CUR MEDS BY ELIG CLIN: HCPCS | Performed by: NEUROLOGICAL SURGERY

## 2021-10-28 PROCEDURE — 99204 OFFICE O/P NEW MOD 45 MIN: CPT | Performed by: NEUROLOGICAL SURGERY

## 2021-10-28 RX ORDER — METHYLPREDNISOLONE 4 MG/1
TABLET ORAL
COMMUNITY
Start: 2021-10-26

## 2021-10-28 RX ORDER — LOSARTAN POTASSIUM AND HYDROCHLOROTHIAZIDE 12.5; 1 MG/1; MG/1
TABLET ORAL
COMMUNITY
Start: 2021-10-26

## 2021-10-28 RX ORDER — OXYCODONE HYDROCHLORIDE AND ACETAMINOPHEN 5; 325 MG/1; MG/1
TABLET ORAL
COMMUNITY
Start: 2021-10-12 | End: 2021-12-22

## 2021-10-28 NOTE — PROGRESS NOTES
[facility] NEUROSURGERY  [address]  [address] [facility] [address]       Chief Complaint:   Chief Complaint   Patient presents with    Results     Review MRI results        HPI:     I had the pleasure of seeing Matthew Alicea today in neurosurgical clinic. As you know this delightful 71-year-old right-handed  father of 3 smoker and  has been treated for stage IIIb non-small cell lung carcinoma. During routine screening is found to have 2 asymptomatic enhancing lesions in the brain 1 in the left occipital lobe and one in the right frontal region. His repeat MRI after chemotherapy demonstrated reduction in size however persistence. Upon specific questioning and against this background he denies any headache. There is no history of seizures. He denies any change in vision, numbness, weakness, tingling. Past Medical History:   Diagnosis Date    Arthritis     R hip     Cancer ([address]     lung     Hypertension      Past Surgical History:   Procedure Laterality Date    CATHETER INSERTION N/A 2021    MEDIPORT INSERTION performed by Nila Gamboa MD at [address] [place] [address],[facility] IR [facility],[address] BX LUNG/MEDIASTINUM  2021    IR PERCUT BX LUNG/MEDIASTINUM 2021 [facility] CT      Family History   Problem Relation Age of Onset    Cancer Brother         lung       Social History     Socioeconomic History    Marital status: Life Partner     Spouse name: Mohan Russell     Number of children: 3    Years of education: some college     Highest education level: Not on file   Occupational History    Not on file   Tobacco Use    Smoking status: Former Smoker     Quit date:      Years since quittin.8    Smokeless tobacco: Never Used    Tobacco comment: Quit May 2021    Substance and Sexual Activity    Alcohol use: Not Currently     Alcohol/week: 1.0 standard drinks     Types: 1 Cans of beer per week     Comment: weekend    Drug use:  Yes Types: Marijuana     Comment: smoked 7/12/21; mostly edibles     Sexual activity: Not on file   Other Topics Concern    Not on file   Social History Narrative    Not on file     Social Determinants of Health     Financial Resource Strain:     Difficulty of Paying Living Expenses:    Food Insecurity:     Worried About Running Out of Food in the Last Year:     [address] in the Last Year:    Transportation Needs:     Lack of Transportation (Medical):  Lack of Transportation (Non-Medical):    Physical Activity:     Days of Exercise per Week:     Minutes of Exercise per Session:    Stress:     Feeling of Stress :    Social Connections:     Frequency of Communication with Friends and Family:     Frequency of Social Gatherings with Friends and Family:     Attends [employer] Services:     Active Member of Clubs or Organizations:     Attends Club or Organization Meetings:     Marital Status:    Intimate Partner Violence:     Fear of Current or Ex-Partner:     Emotionally Abused:     Physically Abused:     Sexually Abused:        Medications:   Current Outpatient Medications   Medication Sig Dispense Refill    losartan-hydroCHLOROthiazide (HYZAAR) 100-12.5 MG per tablet take 1 tablet by mouth once daily      methylPREDNISolone (MEDROL DOSEPACK) 4 MG tablet use as directed      oxyCODONE-acetaminophen (PERCOCET) 5-325 MG per tablet take 1 tablet by mouth every 4 to 6 hours if needed for pain      Magic Mouthwash (MIRACLE MOUTHWASH) Swish and swallow 10 mLs as needed for Irritation Every 3 to 4 hours as needed for mouth sore.  metFORMIN (GLUCOPHAGE) 500 MG tablet Take 500 mg by mouth 2 times daily (with meals)      sucralfate (CARAFATE) 1 GM tablet Take 1 tablet by mouth 4 times daily Dissolve tablet n small amount of H20 & drink.  120 tablet 3    fluticasone-salmeterol (ADVAIR) 500-50 MCG/DOSE diskus inhaler inhale 1 puff by mouth twice a day (RINSE MOUTH AFTER USE)      albuterol sulfate  (90 Base) MCG/ACT inhaler inhale 2 puffs by mouth every 6 hours if needed      ondansetron (ZOFRAN) 8 MG tablet take 1 tablet by mouth every 8 hours if needed for nausea and vomiting      dexamethasone (DECADRON) 4 MG tablet take 5 tablets (20 MILLIGRAMS) by mouth as directed 12 hours AND . ..  (REFER TO PRESCRIPTION NOTES).  losartan (COZAAR) 100 MG tablet Take 100 mg by mouth daily      predniSONE (DELTASONE) 10 MG tablet Take 10 mg by mouth every other day      ibuprofen (ADVIL;MOTRIN) 200 MG tablet Take 400 mg by mouth every 6 hours as needed for Pain      Naproxen Sodium (ALEVE PO) Take by mouth as needed      acetaminophen (TYLENOL) 500 MG tablet Take 500 mg by mouth every 6 hours as needed for Pain       No current facility-administered medications for this visit. Allergies:    Food       Review of Systems:    Denies any chest pain, shortness of breath, headache, dyspnea, recent weight loss, fevers, chills or night sweats. Physical Examination:    /80   Pulse 91   Temp 98.2 °F (36.8 °C) (Temporal)   Resp 18   Ht 5' 11\" (1.803 m)   Wt 228 lb (103.4 kg)   SpO2 98%   BMI 31.80 kg/m²      On focused neurological examination, he  is awake alert oriented and rationally conversant. Speech is clear and fluent. Pupils are equal and reactive to light bilaterally, extraocular movements are intact, visual fields are full to confrontation. His  face is symmetric and grossly intact to fine touch. Uvula and tongue are both midline. Shoulder shrug is symmetric and strong. Motor examination reveals preserved power in the upper and lower extremities at 5 out of 5 throughout. Reflexes are symmetric and brisk. Plantar responses are downgoing. There is no clonus. Patient is intact to fine touch in all dermatomes throughout.       ASSESSMENT:    I personally reviewed Jackie Nolasco's radiographic images, particularly his MRI of the brain dated 15 October which demonstrates a decrease in the size of his right frontal and occipital lobe lesions both of which are subcentimeter currently. 1. Brain tumor [facility])      MEDICAL DECISION MAKING & PLAN:      Agree with stereotactic radiosurgery boost of radiation to these 2 lesions. Planning will be according to radiation oncology schedule. I explained my role in this process to the patient and reiterated our goal and offering him this treatment. All questions were answered. Thank you so much for allowing us to participate in the care of this patient.     Electronically signed by Roland Wood MD on 10/28/2021 at 10:23 AM

## 2021-11-02 ENCOUNTER — HOSPITAL ENCOUNTER (OUTPATIENT)
Dept: RADIATION ONCOLOGY | Age: 61
Discharge: HOME | End: 2021-11-02
Attending: SPECIALIST
Payer: COMMERCIAL

## 2021-11-02 PROCEDURE — 77334 RADIATION TREATMENT AID(S): CPT | Performed by: SPECIALIST

## 2021-11-02 PROCEDURE — 6360000004 HC RX CONTRAST MEDICATION: Performed by: SPECIALIST

## 2021-11-02 PROCEDURE — 77263 THER RADIOLOGY TX PLNG CPLX: CPT | Performed by: SPECIALIST

## 2021-11-02 RX ADMIN — IOPAMIDOL 125 ML: 755 INJECTION, SOLUTION INTRAVENOUS at 11:01

## 2021-11-18 PROCEDURE — 77300 RADIATION THERAPY DOSE PLAN: CPT | Performed by: SPECIALIST

## 2021-11-18 PROCEDURE — 77295 3-D RADIOTHERAPY PLAN: CPT | Performed by: SPECIALIST

## 2021-11-18 PROCEDURE — 77334 RADIATION TREATMENT AID(S): CPT | Performed by: SPECIALIST

## 2021-11-23 ENCOUNTER — HOSPITAL ENCOUNTER (OUTPATIENT)
Dept: RADIATION ONCOLOGY | Age: 61
Discharge: HOME | End: 2021-11-23
Attending: SPECIALIST
Payer: COMMERCIAL

## 2021-11-23 VITALS
SYSTOLIC BLOOD PRESSURE: 122 MMHG | DIASTOLIC BLOOD PRESSURE: 78 MMHG | RESPIRATION RATE: 18 BRPM | HEART RATE: 103 BPM | OXYGEN SATURATION: 97 % | TEMPERATURE: 97.8 F

## 2021-11-23 PROCEDURE — 77432 STEREOTACTIC RADIATION TRMT: CPT | Performed by: SPECIALIST

## 2021-11-23 PROCEDURE — 77336 RADIATION PHYSICS CONSULT: CPT | Performed by: SPECIALIST

## 2021-11-23 PROCEDURE — 77372 SRS LINEAR BASED: CPT | Performed by: SPECIALIST

## 2021-11-23 PROCEDURE — 61796 SRS CRANIAL LESION SIMPLE: CPT | Performed by: NEUROLOGICAL SURGERY

## 2021-11-23 PROCEDURE — 61797 SRS CRAN LES SIMPLE ADDL: CPT | Performed by: NEUROLOGICAL SURGERY

## 2021-11-23 NOTE — PROGRESS NOTES
Laqueta Sports  11/23/2021  Wt Readings from Last 3 Encounters:   10/28/21 228 lb (103.4 kg)   10/26/21 228 lb 6.4 oz (103.6 kg)   10/12/21 228 lb 9.6 oz (103.7 kg)     There is no height or weight on file to calculate BMI. Treatment Area:SRS    Patient was seen today for weekly visit. Comfort Alteration  KPS:90%  Fatigue: None      CNS Alteration  Orientation: time place person  Neuropathy-Motor: no  Ataxia: Absent   Speech Impairment: No  Seizures: No  Urinary Incontinence: No  Bowel Incontinence: No  Insomnia: No    Sensory Alteration: no    Nutritional Alteration  Anorexia: No   Nausea: No   Vomiting: No    Skin Alteration   Sensation:n    Radiation Dermatitis:  no    Alopecia: No    Emotional  Coping: effective    Injury, potential bleeding or infection: no          No results found for: GLUCOSE    /78   Pulse 103   Temp 97.8 °F (36.6 °C) (Temporal)   Resp 18   SpO2 97%   BP within normal range? yes      Assessment/Plan: Pt completed treatment today and tolerated it well.   Shelly Jacobs RN

## 2021-11-23 NOTE — PROGRESS NOTES
Radiation Treatment Summary    Patient Name:  Cristobal Shrestha,  1960,  62 y.o., male       Referring Physician: Lorena Francis MD  [address],  [facility]      PCP: Mario Marshall DO       Diagnosis: Brain Mets       Recent History: Brain tumors    Site Start [address] [facility] ED Fractions Dose (cGy) Fx Dose (cGy) Technique   Right Frontal met 11/23/2021 11/23/2021 0 1 2400 2400 SRS   Left Occipital met 11/23/2021 11/23/2021 0 1 2400 2400 SRS       Response/Tolerance: Aravind tolerated treatment well without complication. Follow-up:  30-day in the office with Radiation Oncology      Thank you for the opportunity to participate in multidisciplinary management of this remarkable and pleasant patient.       Cora Amato MD, [address], Jacskon Mckeon, [address] [place] [address]    Department of Radiation Oncology  [address]: [phone] (YCQ: [phone])  [address] [place] [address]: [phone] (VRJ: [phone])  [facility]:  [phone] (OSX:  [phone])

## 2021-11-23 NOTE — PROGRESS NOTES
RADIATION ONCOLOGY PROCEDURE NOTE          Mr.Godfrey G Franklin Leventhal underwent fractionated external beam radiation therapy using a SBRT / SABR technique. I was personally present for the treatment planning imaging and pt setup to ensure appropriate immobilization to meet current SHANITA standards. After a detailed review of the treatment plan and appropriate physics QA / oversight this pt was scheduled and underwent hypo fractionated treatment as noted per the D/I in [facility]. Typical fractionation schemes include but are not limited to 2400 cGy in 3-5 fractions. The NCCN guidelines and pt workup including a detailed discussion of the risks, benefits and alternative were discussed previously [RTOG dose constraints met per plan as applicable- see Mosaiq]. The pt verbalized understanding for the risks of this procedure today prior to Tx. Today, after a dual identification time out (including a brief plan overview )- I personally reviewed the complex multifaceted immobilization apparatus  +/- Synergy (PRN), patient position, and 4D imaging (if applicable) prior to the treatment delivery to ensure accuracy. The SBRT team, including myself, were all present for the set up CT scan and delivery of the fraction (the latter on a PRN basis). The patient successfully completed the procedure today in stable condition; this procedure was well-tolerated today.        Hitesh Ordaz has now received the followin cGy in 1/1 fractions directed to the Right Frontal met   2400 cGy in 1/1 fractions directed to the Left Occipital met      Juani Pandya MD, [address], Nava Toro, [facility]    Department of Radiation Oncology  [address] [place] [address]) [facility]: [phone] (BKE: [phone])  Zoe Michelle [facility]: [phone] ([facility]: [phone])  [facility]) [facility]:  [phone] (OAY:  [phone])

## 2021-12-15 ENCOUNTER — HOSPITAL ENCOUNTER (OUTPATIENT)
Dept: CT IMAGING | Age: 61
Discharge: HOME | End: 2021-12-15
Payer: COMMERCIAL

## 2021-12-15 DIAGNOSIS — C79.49 SECONDARY MALIGNANT NEOPLASM OF BRAIN AND SPINAL CORD (HCC): ICD-10-CM

## 2021-12-15 DIAGNOSIS — C34.11 MALIGNANT NEOPLASM OF UPPER LOBE OF RIGHT LUNG (HCC): ICD-10-CM

## 2021-12-15 DIAGNOSIS — C79.31 SECONDARY MALIGNANT NEOPLASM OF BRAIN AND SPINAL CORD (HCC): ICD-10-CM

## 2021-12-15 PROCEDURE — 71260 CT THORAX DX C+: CPT

## 2021-12-15 PROCEDURE — 6360000004 HC RX CONTRAST MEDICATION: Performed by: RADIOLOGY

## 2021-12-15 PROCEDURE — 74177 CT ABD & PELVIS W/CONTRAST: CPT

## 2021-12-15 RX ADMIN — IOPAMIDOL 75 ML: 755 INJECTION, SOLUTION INTRAVENOUS at 12:59

## 2021-12-15 RX ADMIN — IOHEXOL 50 ML: 240 INJECTION, SOLUTION INTRATHECAL; INTRAVASCULAR; INTRAVENOUS; ORAL at 12:59

## 2021-12-22 ENCOUNTER — HOSPITAL ENCOUNTER (OUTPATIENT)
Dept: RADIATION ONCOLOGY | Age: 61
Discharge: HOME | End: 2021-12-22
Attending: SPECIALIST

## 2021-12-22 VITALS
TEMPERATURE: 98.5 F | DIASTOLIC BLOOD PRESSURE: 82 MMHG | SYSTOLIC BLOOD PRESSURE: 128 MMHG | HEART RATE: 100 BPM | OXYGEN SATURATION: 99 % | RESPIRATION RATE: 16 BRPM

## 2021-12-22 DIAGNOSIS — C79.31 BRAIN METASTASES (HCC): ICD-10-CM

## 2021-12-22 DIAGNOSIS — C79.31 BRAIN METASTASES (HCC): Primary | ICD-10-CM

## 2021-12-22 DIAGNOSIS — C79.31 METASTASIS TO BRAIN (HCC): Primary | ICD-10-CM

## 2021-12-22 DIAGNOSIS — Z92.3 STATUS POST STEREOTACTIC RADIOSURGERY: ICD-10-CM

## 2021-12-22 PROCEDURE — 99999 PR OFFICE/OUTPT VISIT,PROCEDURE ONLY: CPT | Performed by: NURSE PRACTITIONER

## 2021-12-22 NOTE — PROGRESS NOTES
RADIATION ONCOLOGY  4 week follow up       12/21/2021       NAME:  Perico Herrera    YOB: 1960    Diagnosis:  Brain Metastases ([address]    Subjective:  On 11/23/2021, Perico Herrera completed stereotactic radiosurgery to 2 sites as followed:    · Site #1: R frontal met, dose 2400 cGy/ 1 fraction, completed 11/23/2021. · Site #2: L occipital met, dose 2400 cGy/ 1 fraction completed 11/23/2021. The patient is seen today in 4 week post [facility] completion symptom management check. The patient complains of headaches, various sites, occurring daily for past 2 weeks, describes as ache. Headache relieved temporary taking as needed Tylenol. Started on Solu medrol dose adrian per Medical Oncology (patient previously taking Dexamethasone, tapered till discontinued). No seizure activity, no dizziness or vomiting. No memory loss, behavior change or confusion. The patient denies diplopia or loss or vision. The patient reports blurry vision only with reading, occurred prior to brain mets diagnosis and SRS treatment. The patient reports fatigue and generalized weakness. 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 The patient is receiving immunotherapy Imfinzi every 2 weeks per Medical Oncology. Patient is following with:     Danette Sheldon. Neurosurgery- Dr. Ruba Downs-  Dr. Jason Berger- Dr. Cecilia Patterson     Pain: controlled. Past medical, surgical, social and family histories reviewed and updated as indicated.     ALLERGIES:  Food         Current Outpatient Medications   Medication Sig Dispense Refill    losartan-hydroCHLOROthiazide (HYZAAR) 100-12.5 MG per tablet take 1 tablet by mouth once daily      methylPREDNISolone (MEDROL DOSEPACK) 4 MG tablet use as directed      metFORMIN (GLUCOPHAGE) 500 MG tablet Take 500 mg by mouth 2 times daily (with meals)      fluticasone-salmeterol (ADVAIR) 500-50 MCG/DOSE diskus inhaler inhale 1 puff by mouth twice a day (RINSE MOUTH AFTER USE)      albuterol sulfate  (90 Base) MCG/ACT inhaler inhale 2 puffs by mouth every 6 hours if needed      ondansetron (ZOFRAN) 8 MG tablet take 1 tablet by mouth every 8 hours if needed for nausea and vomiting      ibuprofen (ADVIL;MOTRIN) 200 MG tablet Take 400 mg by mouth every 6 hours as needed for Pain      acetaminophen (TYLENOL) 500 MG tablet Take 500 mg by mouth every 6 hours as needed for Pain      dexamethasone (DECADRON) 4 MG tablet take 5 tablets (20 MILLIGRAMS) by mouth as directed 12 hours AND . ..  (REFER TO PRESCRIPTION NOTES). (Patient not taking: Reported on 12/22/2021)      predniSONE (DELTASONE) 10 MG tablet Take 10 mg by mouth every other day (Patient not taking: Reported on 12/22/2021)       No current facility-administered medications for this encounter. Physical Examination:   Vitals:    12/22/21 1003   BP: 128/82   Pulse: 100   Resp: 16   Temp: 98.5 °F (36.9 °C)   SpO2: 99%       Wt Readings from Last 3 Encounters:   10/28/21 228 lb (103.4 kg)   10/26/21 228 lb 6.4 oz (103.6 kg)   10/12/21 228 lb 9.6 oz (103.7 kg)       Alert and fully ambulatory. Pleasant and conversant. Lung sounds with few rhonchi heard in RLL. Rest of lung field CTA. Heart regular rate and rhythm. Abdomen soft, rounded with active BS. BLE with no edema noted. ASSESSMENT/PLAN:     NSCLC with brain metastases. Post SRS to 2 sites (#1 R Frontal met; #2 L occipital met) completed 11/23/2021. Headache to various locations, occurring daily. Started on Medrol dose pack per Medical Oncology. High resolution MRI of the brain to be completed between the dates of January 26- February 2, 2022 (ordered). I discussed follow up plans with Darylene Sark.  Radiation Oncology follow-up visit scheduled Friday February 4, 2022 at 8:00 AM.     Darylene Sark is to follow up with other physicians/ APPs involved in their care as directed (including but not limited to Medical Oncology, Neurosurgery, Pulmonary and Primary Care). The patient was given our contact number in the event that if at any time they change their mind and would like to return to the clinic to see either myself or one of the Radiation Oncologists, they can simply call us and we would be happy to see them sooner. Thank you for involving us in the management of this extremely pleasant patient. More than 15 min was in direct contact with pt coordinating/giving care. >50% of the visit was spent in counseling the pt on the following: Follow up care    The nurses notes were reviewed and incorporated into this assessment and plan. Questions answered to apparent satisfaction.       Ajay Chew, MSN, APRN, CNP  Certified Nurse Practitioner for [address] [place] [address] Barrier: [phone]/ F: [phone]   [address] [facility] Barrier: [phone] / F: [phone]

## 2021-12-22 NOTE — PROGRESS NOTES
Georgina Mando  12/22/2021  10:09 AM      Vitals:    12/22/21 1003   BP: 128/82   Pulse: 100   Resp: 16   Temp: 98.5 °F (36.9 °C)   SpO2: 99%    : Wt Readings from Last 3 Encounters:   10/28/21 228 lb (103.4 kg)   10/26/21 228 lb 6.4 oz (103.6 kg)   10/12/21 228 lb 9.6 oz (103.7 kg)                Current Outpatient Medications:     losartan-hydroCHLOROthiazide (HYZAAR) 100-12.5 MG per tablet, take 1 tablet by mouth once daily, Disp: , Rfl:     methylPREDNISolone (MEDROL DOSEPACK) 4 MG tablet, use as directed, Disp: , Rfl:     metFORMIN (GLUCOPHAGE) 500 MG tablet, Take 500 mg by mouth 2 times daily (with meals), Disp: , Rfl:     fluticasone-salmeterol (ADVAIR) 500-50 MCG/DOSE diskus inhaler, inhale 1 puff by mouth twice a day (RINSE MOUTH AFTER USE), Disp: , Rfl:     albuterol sulfate  (90 Base) MCG/ACT inhaler, inhale 2 puffs by mouth every 6 hours if needed, Disp: , Rfl:     ondansetron (ZOFRAN) 8 MG tablet, take 1 tablet by mouth every 8 hours if needed for nausea and vomiting, Disp: , Rfl:     dexamethasone (DECADRON) 4 MG tablet, take 5 tablets (20 MILLIGRAMS) by mouth as directed 12 hours AND . ..  (REFER TO PRESCRIPTION NOTES). , Disp: , Rfl:     predniSONE (DELTASONE) 10 MG tablet, Take 10 mg by mouth every other day, Disp: , Rfl:     ibuprofen (ADVIL;MOTRIN) 200 MG tablet, Take 400 mg by mouth every 6 hours as needed for Pain, Disp: , Rfl:     acetaminophen (TYLENOL) 500 MG tablet, Take 500 mg by mouth every 6 hours as needed for Pain, Disp: , Rfl:       Patient is seen today in follow up for lung cancer with brain mets. Pt completed SRS on 11/23. Pt follows with Dr. Karen Carrillo. Pt had a CT on 12/15 which showed decrease in size. Pt follows with Dr. Karen Carrillo and will start Imfinzi when blood counts allow. Pt states his appetite is starting to return. He states still has some difficulty swallowing. He states the carafate did not help.  He states he has headaches frequently that he is taking a medrol dose pack. He has a productive cough that has gotten worse over past few days. Pt is afebrile. FALLS RISK SCREENING ASSESSMENT    Instructions:  Assess the patient and enter the appropriate indicators that are present for fall risk identification. Total the numbers entered and assign a fall risk score from Table 2.  Reassess patient at a minimum every 12 weeks or with status change. Assessment   Date  12/22/2021     1. Mental Ability: confusion/cognitively impaired No - 0       2. Elimination Issues: incontinence, frequency No - 0       3. Ambulatory: use of assistive devices (walker, cane, off-loading devices), attached to equipment (IV pole, oxygen) No - 0     4. Sensory Limitations: dizziness, vertigo, impaired vision No - 0       5. Age Less than 65 years - 0       6. Medication: diuretics, strong analgesics, hypnotics, sedatives, antihypertensive agents   Yes - 3   7. Falls:  recent history of falls within the last 3 months (not to include slipping or tripping)   No - 0   TOTAL 3    If score of 4 or greater was education given? Yes       TABLE 2   Risk Score Risk Level Plan of Care   0-3 Little or  No Risk 1. Provide assistance as indicated for ambulation activities  2. Reorient confused/cognitively impaired patient  3. Call-light/bell within patient's reach  4. Chair/bed in low position, stretcher/bed with siderails up except when performing patient care activities  5. Educate patient/family/caregiver on falls prevention  6.  Reassess in 12 weeks or with any noted change in patient condition which places them at a risk for a fall   4-6 Moderate Risk 1. Provide assistance as indicated for ambulation activities  2. Reorient confused/cognitively impaired patient  3. Call-light/bell within patient's reach  4. Chair/bed in low position, stretcher/bed with siderails up except when performing patient care activities  5. Educate patient/family/caregiver on falls prevention  6. Falls risk precaution (Yellow sticker Level II) placed on patient chart   7 or   Higher High Risk 1. Place patient in easily observable treatment room  2. Patient attended at all times by family member or staff  3. Provide assistance as indicated for ambulation activities  4. Reorient confused/cognitively impaired patient  5. Call-light/bell within patient's reach  6. Chair/bed in low position, stretcher/bed with siderails up except when performing patient care activities  7. Educate patient/family/caregiver on falls prevention  8. Falls risk precaution (Yellow sticker Level III) placed on patient chart           MALNUTRITION RISK SCREENING ASSESSMENT    12/22/2021   Patient:  Jose E Wahl  Sex:  male    Instructions:  Assess the patient and enter the appropriate indicators that are present for nutrition risk identification. Total the numbers entered and assign a risk score. Follow the appropriate action for total score listed below. Assessment   Date  12/22/2021     1. Have you lost weight without trying? 0- No     2. Have you been eating poorly because of a decreased appetite? 0- No   3. Do you have a diagnosis of head and neck cancer?       0- No                                                                                    TOTAL 0          Score of 0-1: No action  Score 2 or greater:  · For Non-Diabetic Patient: Recommend adding Ensure Complete 2 x daily and provide patient with Ensure wellness bag with coupons  · For Diabetic Patient: Recommend adding Glucerna Shake 2 x daily and provide patient with Glucerna Wellness bag with coupons  · Route to the dietitian via Mikel Parada RN

## (undated) DEVICE — GLOVE ORANGE PI 8   MSG9080

## (undated) DEVICE — BLADE ES ELASTOMERIC COAT INSUL DURABLE BEND UPTO 90DEG

## (undated) DEVICE — GLOVE ORANGE PI 7 1/2   MSG9075

## (undated) DEVICE — GAUZE,SPONGE,4"X4",16PLY,XRAY,STRL,LF: Brand: MEDLINE

## (undated) DEVICE — SET SURG INSTR DISSECT

## (undated) DEVICE — MARKER,SKIN,WI/RULER AND LABELS: Brand: MEDLINE

## (undated) DEVICE — GOWN,SIRUS,FABRNF,XL,20/CS: Brand: MEDLINE

## (undated) DEVICE — APPLICATOR MEDICATED 26 CC SOLUTION HI LT ORNG CHLORAPREP

## (undated) DEVICE — CANNULA IV 18GA L15IN BLNT FILL LUERLOCK HUB MJCT

## (undated) DEVICE — ELECTRODE PT RET AD L9FT HI MOIST COND ADH HYDRGEL CORDED

## (undated) DEVICE — SCANLAN® SUTURE BOOT™ INSTRUMENT JAW COVERS - ORIGINAL YELLOW, MINI PKG (3 PAIR/CARTRIDGE, 1 CARTRIDGE/PKG): Brand: SCANLAN® SUTURE BOOT™ INSTRUMENT JAW COVERS

## (undated) DEVICE — SYRINGE MED 10ML TRNSLUC BRL PLUNG BLK MRK POLYPR CTRL

## (undated) DEVICE — COVER,LIGHT HANDLE,FLX,1/PK: Brand: MEDLINE INDUSTRIES, INC.

## (undated) DEVICE — TOWEL,OR,DSP,ST,BLUE,STD,6/PK,12PK/CS: Brand: MEDLINE

## (undated) DEVICE — PENCIL ES L3M BTTN SWCH HOLSTER W/ BLDE ELECTRD EDGE

## (undated) DEVICE — CATHETER HAD L24CM DIA15.5FR BASIC LNG TERM POLYUR STR

## (undated) DEVICE — GOWN,SIRUS,NONRNF,SETINSLV,XL,20/CS: Brand: MEDLINE

## (undated) DEVICE — DOUBLE BASIN SET: Brand: MEDLINE INDUSTRIES, INC.

## (undated) DEVICE — PACK,LAPAROTOMY,NO GOWNS: Brand: MEDLINE

## (undated) DEVICE — NEEDLE HYPO 25GA L1.5IN BLU POLYPR HUB S STL REG BVL STR

## (undated) DEVICE — INTENDED FOR TISSUE SEPARATION, AND OTHER PROCEDURES THAT REQUIRE A SHARP SURGICAL BLADE TO PUNCTURE OR CUT.: Brand: BARD-PARKER ® STAINLESS STEEL BLADES

## (undated) DEVICE — NDL CNTR 40CT FM MAG: Brand: MEDLINE INDUSTRIES, INC.